# Patient Record
Sex: FEMALE | Race: WHITE | Employment: UNEMPLOYED | URBAN - METROPOLITAN AREA
[De-identification: names, ages, dates, MRNs, and addresses within clinical notes are randomized per-mention and may not be internally consistent; named-entity substitution may affect disease eponyms.]

---

## 2018-03-12 ENCOUNTER — HOSPITAL ENCOUNTER (OUTPATIENT)
Dept: MAMMOGRAPHY | Age: 43
Discharge: HOME | End: 2018-03-14
Payer: COMMERCIAL

## 2018-03-12 DIAGNOSIS — Z12.39 SCREENING BREAST EXAMINATION: ICD-10-CM

## 2018-03-12 PROCEDURE — 77067 SCR MAMMO BI INCL CAD: CPT

## 2018-06-17 ENCOUNTER — OFFICE VISIT (OUTPATIENT)
Dept: FAMILY MEDICINE CLINIC | Age: 43
End: 2018-06-17
Payer: COMMERCIAL

## 2018-06-17 VITALS
SYSTOLIC BLOOD PRESSURE: 108 MMHG | HEIGHT: 67 IN | DIASTOLIC BLOOD PRESSURE: 70 MMHG | HEART RATE: 84 BPM | WEIGHT: 140 LBS | TEMPERATURE: 98.4 F | BODY MASS INDEX: 21.97 KG/M2 | OXYGEN SATURATION: 97 %

## 2018-06-17 DIAGNOSIS — M79.671 RIGHT FOOT PAIN: Primary | ICD-10-CM

## 2018-06-17 PROCEDURE — 99213 OFFICE O/P EST LOW 20 MIN: CPT | Performed by: PHYSICIAN ASSISTANT

## 2018-06-19 ENCOUNTER — TELEPHONE (OUTPATIENT)
Dept: FAMILY MEDICINE CLINIC | Age: 43
End: 2018-06-19

## 2018-06-19 DIAGNOSIS — M79.671 RIGHT FOOT PAIN: ICD-10-CM

## 2018-07-13 ENCOUNTER — OFFICE VISIT (OUTPATIENT)
Dept: FAMILY MEDICINE CLINIC | Age: 43
End: 2018-07-13
Payer: COMMERCIAL

## 2018-07-13 VITALS
SYSTOLIC BLOOD PRESSURE: 119 MMHG | RESPIRATION RATE: 16 BRPM | DIASTOLIC BLOOD PRESSURE: 82 MMHG | TEMPERATURE: 98.3 F | HEIGHT: 67 IN | OXYGEN SATURATION: 97 % | WEIGHT: 140.4 LBS | HEART RATE: 67 BPM | BODY MASS INDEX: 22.03 KG/M2

## 2018-07-13 DIAGNOSIS — B36.0 TINEA VERSICOLOR: Primary | ICD-10-CM

## 2018-07-13 PROCEDURE — 99213 OFFICE O/P EST LOW 20 MIN: CPT | Performed by: FAMILY MEDICINE

## 2018-07-13 RX ORDER — ITRACONAZOLE 100 MG/1
200 CAPSULE ORAL DAILY
Qty: 10 CAPSULE | Refills: 0 | Status: SHIPPED | OUTPATIENT
Start: 2018-07-13 | End: 2018-07-25 | Stop reason: SDUPTHER

## 2018-07-16 ENCOUNTER — TELEPHONE (OUTPATIENT)
Dept: FAMILY MEDICINE CLINIC | Age: 43
End: 2018-07-16

## 2018-07-25 RX ORDER — ITRACONAZOLE 100 MG/1
200 CAPSULE ORAL DAILY
Qty: 14 CAPSULE | Refills: 0 | Status: SHIPPED | OUTPATIENT
Start: 2018-07-25 | End: 2018-08-01

## 2018-08-14 ENCOUNTER — HOSPITAL ENCOUNTER (OUTPATIENT)
Age: 43
Discharge: HOME | End: 2018-08-16
Payer: COMMERCIAL

## 2018-08-14 LAB
ALBUMIN SERPL-MCNC: 4.5 G/DL (ref 3.5–5.2)
ALP BLD-CCNC: 52 U/L (ref 35–104)
ALT SERPL-CCNC: 16 U/L (ref 0–32)
ANION GAP SERPL CALCULATED.3IONS-SCNC: 14 MMOL/L (ref 7–16)
AST SERPL-CCNC: 23 U/L (ref 0–31)
BASOPHILS ABSOLUTE: 0.06 E9/L (ref 0–0.2)
BASOPHILS RELATIVE PERCENT: 0.7 % (ref 0–2)
BILIRUB SERPL-MCNC: 0.9 MG/DL (ref 0–1.2)
BUN BLDV-MCNC: 7 MG/DL (ref 6–20)
CALCIUM SERPL-MCNC: 9.5 MG/DL (ref 8.6–10.2)
CHLORIDE BLD-SCNC: 101 MMOL/L (ref 98–107)
CO2: 27 MMOL/L (ref 22–29)
CREAT SERPL-MCNC: 0.9 MG/DL (ref 0.5–1)
EOSINOPHILS ABSOLUTE: 0.12 E9/L (ref 0.05–0.5)
EOSINOPHILS RELATIVE PERCENT: 1.4 % (ref 0–6)
FERRITIN: 92 NG/ML
GFR AFRICAN AMERICAN: >60
GFR NON-AFRICAN AMERICAN: >60 ML/MIN/1.73
GLUCOSE BLD-MCNC: 90 MG/DL (ref 74–109)
HCT VFR BLD CALC: 45.7 % (ref 34–48)
HEMOGLOBIN: 14.7 G/DL (ref 11.5–15.5)
IMMATURE GRANULOCYTES #: 0.03 E9/L
IMMATURE GRANULOCYTES %: 0.4 % (ref 0–5)
IRON SATURATION: 29 % (ref 15–50)
IRON: 87 MCG/DL (ref 37–145)
LYMPHOCYTES ABSOLUTE: 1.7 E9/L (ref 1.5–4)
LYMPHOCYTES RELATIVE PERCENT: 19.9 % (ref 20–42)
MCH RBC QN AUTO: 29.3 PG (ref 26–35)
MCHC RBC AUTO-ENTMCNC: 32.2 % (ref 32–34.5)
MCV RBC AUTO: 91.2 FL (ref 80–99.9)
MONOCYTES ABSOLUTE: 0.46 E9/L (ref 0.1–0.95)
MONOCYTES RELATIVE PERCENT: 5.4 % (ref 2–12)
NEUTROPHILS ABSOLUTE: 6.17 E9/L (ref 1.8–7.3)
NEUTROPHILS RELATIVE PERCENT: 72.2 % (ref 43–80)
PDW BLD-RTO: 13 FL (ref 11.5–15)
PLATELET # BLD: 276 E9/L (ref 130–450)
PMV BLD AUTO: 11.7 FL (ref 7–12)
POTASSIUM SERPL-SCNC: 4.6 MMOL/L (ref 3.5–5)
RBC # BLD: 5.01 E12/L (ref 3.5–5.5)
SODIUM BLD-SCNC: 142 MMOL/L (ref 132–146)
T3 FREE: 3.3 PG/ML (ref 2–4.4)
T4 FREE: 1.54 NG/DL (ref 0.93–1.7)
TOTAL IRON BINDING CAPACITY: 304 MCG/DL (ref 250–450)
TOTAL PROTEIN: 7.5 G/DL (ref 6.4–8.3)
TSH SERPL DL<=0.05 MIU/L-ACNC: 1.57 UIU/ML (ref 0.27–4.2)
WBC # BLD: 8.5 E9/L (ref 4.5–11.5)

## 2018-08-14 PROCEDURE — 82728 ASSAY OF FERRITIN: CPT

## 2018-08-14 PROCEDURE — 84439 ASSAY OF FREE THYROXINE: CPT

## 2018-08-14 PROCEDURE — 86038 ANTINUCLEAR ANTIBODIES: CPT

## 2018-08-14 PROCEDURE — 82306 VITAMIN D 25 HYDROXY: CPT

## 2018-08-14 PROCEDURE — 85025 COMPLETE CBC W/AUTO DIFF WBC: CPT

## 2018-08-14 PROCEDURE — 36415 COLL VENOUS BLD VENIPUNCTURE: CPT

## 2018-08-14 PROCEDURE — 84443 ASSAY THYROID STIM HORMONE: CPT

## 2018-08-14 PROCEDURE — 84481 FREE ASSAY (FT-3): CPT

## 2018-08-14 PROCEDURE — 83550 IRON BINDING TEST: CPT

## 2018-08-14 PROCEDURE — 80053 COMPREHEN METABOLIC PANEL: CPT

## 2018-08-14 PROCEDURE — 84630 ASSAY OF ZINC: CPT

## 2018-08-14 PROCEDURE — 83540 ASSAY OF IRON: CPT

## 2018-08-15 LAB
ANTI-NUCLEAR ANTIBODY (ANA): NEGATIVE
T3 TOTAL: 109.6 NG/DL (ref 80–200)
T4 TOTAL: 8.6 MCG/DL (ref 4.5–11.7)
VITAMIN D 25-HYDROXY: 45 NG/ML (ref 30–100)

## 2018-08-17 LAB — ZINC: 72 UG/DL (ref 60–120)

## 2018-08-20 ENCOUNTER — PATIENT MESSAGE (OUTPATIENT)
Dept: FAMILY MEDICINE CLINIC | Age: 43
End: 2018-08-20

## 2018-08-20 DIAGNOSIS — L80 VITILIGO: Primary | ICD-10-CM

## 2018-08-21 NOTE — TELEPHONE ENCOUNTER
From: Adeline Hearn  To: Genevieve Orozco MD  Sent: 8/20/2018 7:19 PM EDT  Subject: Visit Jay Lombardo,    I have some concerns regarding the recent visit where I was treated for Tinea Versicolor. It didn't go away with the second round of antibiotic so I followed up with a dermatologist, (Dr Francisca Pruitt) at [id number] [place] [address] Dermatology. He diagnosed it as Vitiligo and prescribed a steroid cream. He stressed the sooner we treat, the better chance for remission. I have a great amount of concern since it has spread considerably since my last visit with you. I would like to speak with you regarding following up with a rheumatologist as soon as possible. Any assistance would be greatly appreciated.     Thank you,  Shadi Ornelas  [phone]

## 2018-08-23 ENCOUNTER — PATIENT MESSAGE (OUTPATIENT)
Dept: FAMILY MEDICINE CLINIC | Age: 43
End: 2018-08-23

## 2018-08-24 ENCOUNTER — TELEPHONE (OUTPATIENT)
Dept: FAMILY MEDICINE CLINIC | Age: 43
End: 2018-08-24

## 2018-08-24 RX ORDER — PREDNISONE 20 MG/1
20 TABLET ORAL DAILY
Qty: 21 TABLET | Refills: 0 | Status: SHIPPED | OUTPATIENT
Start: 2018-08-24 | End: 2018-09-14

## 2018-09-04 ENCOUNTER — TELEPHONE (OUTPATIENT)
Dept: FAMILY MEDICINE CLINIC | Age: 43
End: 2018-09-04

## 2018-12-17 ENCOUNTER — OFFICE VISIT (OUTPATIENT)
Dept: FAMILY MEDICINE CLINIC | Age: 43
End: 2018-12-17
Payer: COMMERCIAL

## 2018-12-17 VITALS
BODY MASS INDEX: 22.85 KG/M2 | OXYGEN SATURATION: 98 % | HEART RATE: 68 BPM | TEMPERATURE: 98.5 F | SYSTOLIC BLOOD PRESSURE: 112 MMHG | WEIGHT: 145.6 LBS | HEIGHT: 67 IN | DIASTOLIC BLOOD PRESSURE: 79 MMHG | RESPIRATION RATE: 16 BRPM

## 2018-12-17 DIAGNOSIS — L71.0 PERIORAL DERMATITIS: Primary | ICD-10-CM

## 2018-12-17 DIAGNOSIS — Z00.00 ANNUAL PHYSICAL EXAM: ICD-10-CM

## 2018-12-17 PROCEDURE — 99213 OFFICE O/P EST LOW 20 MIN: CPT | Performed by: FAMILY MEDICINE

## 2018-12-17 RX ORDER — SULFAMETHOXAZOLE AND TRIMETHOPRIM 800; 160 MG/1; MG/1
TABLET ORAL
Refills: 0 | COMMUNITY
Start: 2018-12-14 | End: 2018-12-17

## 2018-12-17 RX ORDER — METRONIDAZOLE 7.5 MG/G
GEL TOPICAL
Qty: 45 G | Refills: 1 | Status: SHIPPED | OUTPATIENT
Start: 2018-12-17 | End: 2019-11-06 | Stop reason: ALTCHOICE

## 2019-01-15 LAB
ALBUMIN SERPL-MCNC: NORMAL G/DL
ALP BLD-CCNC: NORMAL U/L
ALT SERPL-CCNC: NORMAL U/L
ANION GAP SERPL CALCULATED.3IONS-SCNC: NORMAL MMOL/L
AST SERPL-CCNC: NORMAL U/L
BASOPHILS ABSOLUTE: NORMAL /ΜL
BASOPHILS RELATIVE PERCENT: NORMAL %
BILIRUB SERPL-MCNC: NORMAL MG/DL (ref 0.1–1.4)
BUN BLDV-MCNC: NORMAL MG/DL
CALCIUM SERPL-MCNC: NORMAL MG/DL
CHLORIDE BLD-SCNC: NORMAL MMOL/L
CHOLESTEROL, TOTAL: 181 MG/DL
CHOLESTEROL/HDL RATIO: 2.7
CO2: NORMAL MMOL/L
CREAT SERPL-MCNC: NORMAL MG/DL
EOSINOPHILS ABSOLUTE: NORMAL /ΜL
EOSINOPHILS RELATIVE PERCENT: NORMAL %
FOLATE: 19.4
GFR CALCULATED: NORMAL
GLUCOSE BLD-MCNC: 80 MG/DL
HCT VFR BLD CALC: 43.9 % (ref 36–46)
HDLC SERPL-MCNC: 67 MG/DL (ref 35–70)
HEMOGLOBIN: 14.4 G/DL (ref 12–16)
IRON: 0
LDL CHOLESTEROL CALCULATED: 102 MG/DL (ref 0–160)
LYMPHOCYTES ABSOLUTE: NORMAL /ΜL
LYMPHOCYTES RELATIVE PERCENT: NORMAL %
MCH RBC QN AUTO: NORMAL PG
MCHC RBC AUTO-ENTMCNC: NORMAL G/DL
MCV RBC AUTO: NORMAL FL
MONOCYTES ABSOLUTE: NORMAL /ΜL
MONOCYTES RELATIVE PERCENT: NORMAL %
NEUTROPHILS ABSOLUTE: NORMAL /ΜL
NEUTROPHILS RELATIVE PERCENT: NORMAL %
PDW BLD-RTO: NORMAL %
PLATELET # BLD: NORMAL K/ΜL
PMV BLD AUTO: NORMAL FL
POTASSIUM SERPL-SCNC: NORMAL MMOL/L
RBC # BLD: 4.89 10^6/ΜL
SODIUM BLD-SCNC: 142 MMOL/L
TOTAL IRON BINDING CAPACITY: NORMAL
TOTAL PROTEIN: NORMAL
TRIGL SERPL-MCNC: 60 MG/DL
TSH SERPL DL<=0.05 MIU/L-ACNC: 1.11 UIU/ML
VITAMIN B-12: 606
VLDLC SERPL CALC-MCNC: NORMAL MG/DL
WBC # BLD: 6.9 10^3/ML

## 2019-01-29 DIAGNOSIS — Z00.00 ANNUAL PHYSICAL EXAM: ICD-10-CM

## 2019-10-04 ENCOUNTER — HOSPITAL ENCOUNTER (OUTPATIENT)
Age: 44
Discharge: HOME | End: 2019-10-06
Payer: COMMERCIAL

## 2019-10-04 PROCEDURE — 88175 CYTOPATH C/V AUTO FLUID REDO: CPT

## 2019-10-04 PROCEDURE — 87624 HPV HI-RISK TYP POOLED RSLT: CPT

## 2019-10-10 LAB
HPV SAMPLE: NORMAL
HPV TYPE 16: NOT DETECTED
HPV TYPE 18: NOT DETECTED
HPV, HIGH RISK OTHER: NOT DETECTED
INTERPRETATION: NORMAL
SOURCE: NORMAL

## 2019-11-06 ENCOUNTER — OFFICE VISIT (OUTPATIENT)
Dept: FAMILY MEDICINE CLINIC | Age: 44
End: 2019-11-06
Payer: COMMERCIAL

## 2019-11-06 VITALS
HEART RATE: 68 BPM | RESPIRATION RATE: 18 BRPM | OXYGEN SATURATION: 99 % | WEIGHT: 142.8 LBS | HEIGHT: 67 IN | DIASTOLIC BLOOD PRESSURE: 74 MMHG | BODY MASS INDEX: 22.41 KG/M2 | TEMPERATURE: 97.6 F | SYSTOLIC BLOOD PRESSURE: 116 MMHG

## 2019-11-06 DIAGNOSIS — J01.90 ACUTE NON-RECURRENT SINUSITIS, UNSPECIFIED LOCATION: ICD-10-CM

## 2019-11-06 DIAGNOSIS — R51.9 SINUS HEADACHE: ICD-10-CM

## 2019-11-06 DIAGNOSIS — R07.0 PAIN IN THROAT: ICD-10-CM

## 2019-11-06 DIAGNOSIS — J02.9 SORE THROAT: Primary | ICD-10-CM

## 2019-11-06 DIAGNOSIS — R09.82 POSTNASAL DRIP: ICD-10-CM

## 2019-11-06 LAB — S PYO AG THROAT QL: NORMAL

## 2019-11-06 PROCEDURE — 99214 OFFICE O/P EST MOD 30 MIN: CPT | Performed by: PHYSICIAN ASSISTANT

## 2019-11-06 PROCEDURE — 96372 THER/PROPH/DIAG INJ SC/IM: CPT | Performed by: PHYSICIAN ASSISTANT

## 2019-11-06 PROCEDURE — 87880 STREP A ASSAY W/OPTIC: CPT | Performed by: PHYSICIAN ASSISTANT

## 2019-11-06 RX ORDER — CEFDINIR 300 MG/1
300 CAPSULE ORAL 2 TIMES DAILY
Qty: 14 CAPSULE | Refills: 0 | Status: SHIPPED | OUTPATIENT
Start: 2019-11-06 | End: 2019-11-13

## 2019-11-06 RX ORDER — METHYLPREDNISOLONE ACETATE 40 MG/ML
40 INJECTION, SUSPENSION INTRA-ARTICULAR; INTRALESIONAL; INTRAMUSCULAR; SOFT TISSUE ONCE
Status: COMPLETED | OUTPATIENT
Start: 2019-11-06 | End: 2019-11-06

## 2019-11-06 RX ORDER — METHYLPREDNISOLONE 4 MG/1
TABLET ORAL
Qty: 1 KIT | Refills: 0 | Status: SHIPPED | OUTPATIENT
Start: 2019-11-06 | End: 2019-11-19 | Stop reason: ALTCHOICE

## 2019-11-06 RX ADMIN — METHYLPREDNISOLONE ACETATE 40 MG: 40 INJECTION, SUSPENSION INTRA-ARTICULAR; INTRALESIONAL; INTRAMUSCULAR; SOFT TISSUE at 08:59

## 2019-11-18 ENCOUNTER — TELEPHONE (OUTPATIENT)
Dept: FAMILY MEDICINE CLINIC | Age: 44
End: 2019-11-18

## 2019-11-19 ENCOUNTER — HOSPITAL ENCOUNTER (OUTPATIENT)
Age: 44
Discharge: HOME | End: 2019-11-19
Payer: COMMERCIAL

## 2019-11-19 ENCOUNTER — HOSPITAL ENCOUNTER (OUTPATIENT)
Dept: CT IMAGING | Age: 44
Discharge: HOME | End: 2019-11-21
Payer: COMMERCIAL

## 2019-11-19 ENCOUNTER — OFFICE VISIT (OUTPATIENT)
Dept: FAMILY MEDICINE CLINIC | Age: 44
End: 2019-11-19
Payer: COMMERCIAL

## 2019-11-19 VITALS
DIASTOLIC BLOOD PRESSURE: 88 MMHG | TEMPERATURE: 98.5 F | HEART RATE: 80 BPM | WEIGHT: 134 LBS | OXYGEN SATURATION: 98 % | SYSTOLIC BLOOD PRESSURE: 138 MMHG | BODY MASS INDEX: 20.99 KG/M2

## 2019-11-19 DIAGNOSIS — K90.0 CELIAC DISEASE: ICD-10-CM

## 2019-11-19 DIAGNOSIS — R11.2 NAUSEA AND VOMITING, INTRACTABILITY OF VOMITING NOT SPECIFIED, UNSPECIFIED VOMITING TYPE: ICD-10-CM

## 2019-11-19 DIAGNOSIS — R10.33 PERIUMBILICAL ABDOMINAL PAIN: ICD-10-CM

## 2019-11-19 DIAGNOSIS — R11.2 NAUSEA AND VOMITING, INTRACTABILITY OF VOMITING NOT SPECIFIED, UNSPECIFIED VOMITING TYPE: Primary | ICD-10-CM

## 2019-11-19 LAB
ALBUMIN SERPL-MCNC: 4.7 G/DL (ref 3.5–5.2)
ALP BLD-CCNC: 47 U/L (ref 35–104)
ALT SERPL-CCNC: 14 U/L (ref 0–32)
AMYLASE: 60 U/L (ref 20–100)
ANION GAP SERPL CALCULATED.3IONS-SCNC: 14 MMOL/L (ref 7–16)
AST SERPL-CCNC: 20 U/L (ref 0–31)
BASOPHILS ABSOLUTE: 0.06 E9/L (ref 0–0.2)
BASOPHILS RELATIVE PERCENT: 0.6 % (ref 0–2)
BILIRUB SERPL-MCNC: 0.9 MG/DL (ref 0–1.2)
BUN BLDV-MCNC: 8 MG/DL (ref 6–20)
CALCIUM SERPL-MCNC: 9.6 MG/DL (ref 8.6–10.2)
CHLORIDE BLD-SCNC: 102 MMOL/L (ref 98–107)
CO2: 25 MMOL/L (ref 22–29)
CREAT SERPL-MCNC: 1 MG/DL (ref 0.5–1)
EOSINOPHILS ABSOLUTE: 0.14 E9/L (ref 0.05–0.5)
EOSINOPHILS RELATIVE PERCENT: 1.3 % (ref 0–6)
GFR AFRICAN AMERICAN: >60
GFR NON-AFRICAN AMERICAN: >60 ML/MIN/1.73
GLUCOSE BLD-MCNC: 89 MG/DL (ref 74–99)
HCT VFR BLD CALC: 45.6 % (ref 34–48)
HEMOGLOBIN: 14.9 G/DL (ref 11.5–15.5)
IMMATURE GRANULOCYTES #: 0.03 E9/L
IMMATURE GRANULOCYTES %: 0.3 % (ref 0–5)
LIPASE: 33 U/L (ref 13–60)
LYMPHOCYTES ABSOLUTE: 2.36 E9/L (ref 1.5–4)
LYMPHOCYTES RELATIVE PERCENT: 22.1 % (ref 20–42)
MCH RBC QN AUTO: 28.9 PG (ref 26–35)
MCHC RBC AUTO-ENTMCNC: 32.7 % (ref 32–34.5)
MCV RBC AUTO: 88.5 FL (ref 80–99.9)
MONOCYTES ABSOLUTE: 0.57 E9/L (ref 0.1–0.95)
MONOCYTES RELATIVE PERCENT: 5.3 % (ref 2–12)
NEUTROPHILS ABSOLUTE: 7.5 E9/L (ref 1.8–7.3)
NEUTROPHILS RELATIVE PERCENT: 70.4 % (ref 43–80)
PDW BLD-RTO: 12.1 FL (ref 11.5–15)
PLATELET # BLD: 253 E9/L (ref 130–450)
PMV BLD AUTO: 10.6 FL (ref 7–12)
POTASSIUM SERPL-SCNC: 4.1 MMOL/L (ref 3.5–5)
RBC # BLD: 5.15 E12/L (ref 3.5–5.5)
SODIUM BLD-SCNC: 141 MMOL/L (ref 132–146)
TOTAL PROTEIN: 7.1 G/DL (ref 6.4–8.3)
WBC # BLD: 10.7 E9/L (ref 4.5–11.5)

## 2019-11-19 PROCEDURE — 82150 ASSAY OF AMYLASE: CPT

## 2019-11-19 PROCEDURE — 6360000004 HC RX CONTRAST MEDICATION: Performed by: RADIOLOGY

## 2019-11-19 PROCEDURE — 83690 ASSAY OF LIPASE: CPT

## 2019-11-19 PROCEDURE — 86677 HELICOBACTER PYLORI ANTIBODY: CPT

## 2019-11-19 PROCEDURE — 80053 COMPREHEN METABOLIC PANEL: CPT

## 2019-11-19 PROCEDURE — 99214 OFFICE O/P EST MOD 30 MIN: CPT | Performed by: FAMILY MEDICINE

## 2019-11-19 PROCEDURE — 74177 CT ABD & PELVIS W/CONTRAST: CPT

## 2019-11-19 PROCEDURE — 2580000003 HC RX 258: Performed by: RADIOLOGY

## 2019-11-19 PROCEDURE — 85025 COMPLETE CBC W/AUTO DIFF WBC: CPT

## 2019-11-19 PROCEDURE — 36415 COLL VENOUS BLD VENIPUNCTURE: CPT

## 2019-11-19 RX ORDER — SODIUM CHLORIDE 0.9 % (FLUSH) 0.9 %
10 SYRINGE (ML) INJECTION 2 TIMES DAILY
Status: DISCONTINUED | OUTPATIENT
Start: 2019-11-19 | End: 2019-11-22 | Stop reason: HOSPADM

## 2019-11-19 RX ORDER — SUCRALFATE 1 G/1
1 TABLET ORAL 4 TIMES DAILY
Qty: 120 TABLET | Refills: 3 | Status: SHIPPED
Start: 2019-11-19 | End: 2021-03-16 | Stop reason: CLARIF

## 2019-11-19 RX ORDER — OMEPRAZOLE 40 MG/1
40 CAPSULE, DELAYED RELEASE ORAL DAILY
Qty: 30 CAPSULE | Refills: 3 | Status: SHIPPED
Start: 2019-11-19 | End: 2021-03-16 | Stop reason: CLARIF

## 2019-11-19 RX ADMIN — IOPAMIDOL 100 ML: 755 INJECTION, SOLUTION INTRAVENOUS at 16:26

## 2019-11-19 RX ADMIN — Medication 10 ML: at 16:26

## 2019-11-19 RX ADMIN — IOHEXOL 50 ML: 240 INJECTION, SOLUTION INTRATHECAL; INTRAVASCULAR; INTRAVENOUS; ORAL at 16:26

## 2019-11-21 LAB — HELICOBACTER PYLORI IGG: NORMAL

## 2019-12-02 ENCOUNTER — HOSPITAL ENCOUNTER (OUTPATIENT)
Dept: GENERAL RADIOLOGY | Age: 44
Discharge: HOME | End: 2019-12-04
Payer: COMMERCIAL

## 2019-12-02 DIAGNOSIS — R92.2 DENSE BREAST: ICD-10-CM

## 2019-12-02 DIAGNOSIS — Z12.31 VISIT FOR SCREENING MAMMOGRAM: ICD-10-CM

## 2019-12-02 PROCEDURE — 76641 ULTRASOUND BREAST COMPLETE: CPT

## 2019-12-02 PROCEDURE — 77063 BREAST TOMOSYNTHESIS BI: CPT

## 2019-12-09 ENCOUNTER — HOSPITAL ENCOUNTER (OUTPATIENT)
Dept: GENERAL RADIOLOGY | Age: 44
Discharge: HOME | End: 2019-12-11
Payer: COMMERCIAL

## 2019-12-09 DIAGNOSIS — R10.33 PERIUMBILICAL DISCOMFORT: ICD-10-CM

## 2019-12-09 PROCEDURE — 74245 FL UGI W SMALL BOWEL: CPT

## 2019-12-12 ENCOUNTER — HOSPITAL ENCOUNTER (OUTPATIENT)
Age: 44
Setting detail: OUTPATIENT SURGERY
Discharge: HOME | End: 2019-12-12
Attending: INTERNAL MEDICINE | Admitting: INTERNAL MEDICINE
Payer: COMMERCIAL

## 2019-12-12 PROCEDURE — 3609015500 HC GASTRIC/DUODENAL MOTILITY &/OR MANOMETRY STUDY: Performed by: INTERNAL MEDICINE

## 2019-12-12 PROCEDURE — 6370000000 HC RX 637 (ALT 250 FOR IP): Performed by: INTERNAL MEDICINE

## 2019-12-12 RX ORDER — LIDOCAINE HYDROCHLORIDE 20 MG/ML
JELLY TOPICAL PRN
Status: DISCONTINUED | OUTPATIENT
Start: 2019-12-12 | End: 2019-12-12 | Stop reason: ALTCHOICE

## 2019-12-30 ENCOUNTER — TELEPHONE (OUTPATIENT)
Dept: FAMILY MEDICINE CLINIC | Age: 44
End: 2019-12-30

## 2019-12-30 DIAGNOSIS — R79.0 RAISED SERUM IRON: Primary | ICD-10-CM

## 2020-01-02 ENCOUNTER — TELEPHONE (OUTPATIENT)
Dept: FAMILY MEDICINE CLINIC | Age: 45
End: 2020-01-02

## 2020-01-02 NOTE — TELEPHONE ENCOUNTER
Her insurance covers [facility] drs and a referral was sent to dr Miri Wilks at [facility].  She asked if there is someone around here with a [facility] physician instead

## 2020-01-09 ENCOUNTER — HOSPITAL ENCOUNTER (OUTPATIENT)
Dept: CT IMAGING | Age: 45
Discharge: HOME | End: 2020-01-11
Payer: COMMERCIAL

## 2020-01-09 PROCEDURE — 6360000004 HC RX CONTRAST MEDICATION: Performed by: RADIOLOGY

## 2020-01-09 PROCEDURE — 71270 CT THORAX DX C-/C+: CPT

## 2020-01-09 RX ADMIN — IOPAMIDOL 80 ML: 755 INJECTION, SOLUTION INTRAVENOUS at 09:43

## 2020-02-21 LAB
C DIFFICILE TOXINS, PCR: NOT DETECTED
FAT QUALITATIVE NEUTRAL STOOL: NORMAL
FAT QUALITATIVE SPLIT STOOL: NORMAL

## 2020-02-24 LAB
CALPROTECTIN, FECAL: 89 UG/G
PANCREATIC ELASTASE, FECAL: >500 UG/G

## 2020-05-27 LAB
ERYTHROCYTE [DISTWIDTH] IN BLOOD BY AUTOMATED COUNT: 12.9 % (ref 11.5–14)
HCT VFR BLD CALC: 46.1 % (ref 36–46)
HEMOGLOBIN: 14.9 G/DL (ref 12–16)
IRON SATURATION: 24 % (ref 25–45)
IRON: 71 UG/DL (ref 35–150)
MCHC RBC AUTO-ENTMCNC: 32.3 G/DL (ref 32–36)
MCV RBC AUTO: 89 FL (ref 80–100)
PLATELET # BLD: 271 X10E9/L (ref 150–450)
RBC # BLD: 5.19 X10E12/L (ref 4–5.2)
TOTAL IRON BINDING CAPACITY: 302 UG/DL (ref 240–445)
WBC: 7.2 X10E9/L (ref 4.4–11.3)

## 2020-05-28 LAB — TISSUE TRANSGLUTAMINASE IGA: 5 (ref 0–14)

## 2020-05-30 LAB — VITAMIN D 1,25-DIHYDROXY: 44.1 PG/ML (ref 19.9–79.3)

## 2020-12-02 ENCOUNTER — HOSPITAL ENCOUNTER (OUTPATIENT)
Age: 45
Discharge: HOME | End: 2020-12-02
Payer: COMMERCIAL

## 2020-12-02 LAB
HBA1C MFR BLD: 5.1 % (ref 4–5.6)
IRON SATURATION: 54 % (ref 15–50)
IRON: 158 MCG/DL (ref 37–145)
TOTAL IRON BINDING CAPACITY: 291 MCG/DL (ref 250–450)

## 2020-12-02 PROCEDURE — 36415 COLL VENOUS BLD VENIPUNCTURE: CPT

## 2020-12-02 PROCEDURE — 83036 HEMOGLOBIN GLYCOSYLATED A1C: CPT

## 2020-12-02 PROCEDURE — 83550 IRON BINDING TEST: CPT

## 2020-12-02 PROCEDURE — 83540 ASSAY OF IRON: CPT

## 2021-03-15 NOTE — PROGRESS NOTES
[facility] Internal Medicine      SUBJECTIVE:  Vicky Hernandez (:  1975) is a 41 y.o. female here for evaluation of the following chief complaint(s):  New Patient  GI at [facility]. Was prescribed low dose naltrexone. This was for celiac. Only took one pill but had severe side effects. Slept for days. In the morning - has to have multiple hours to get ready. Urgent need to have a bowel movement. Immodium will stop her up completely. Healthy diet. Fresh food. Multiple ear infections in the past.      History of iron deficiency anemia - thought secondary to celiac and/or menstural causes. Ablation was performed. Lymphocytic colitis in the past.     Vitiligo - in inguinal area and was placed on cream.  Stopped during covid. Worried about getting COVID vaccine due to concern about immunosuppression. Dr. Santana Kohler. Wanted her checked for DM because vision had changed. + palpitations - daily. Regular. Notices slower pulse in the morning. Sometimes feels like pins in her feet when she walks. + cold all the time. + wrist pain - bilaterally. Drops things because of it. Father with same symptoms. Review of Systems   Constitutional: Negative for appetite change, chills, fever and unexpected weight change. HENT: Positive for congestion (on and off), dental problem (multiple crowns and fillings. ) and trouble swallowing (occasional). Negative for facial swelling, hearing loss, nosebleeds, sore throat and tinnitus. Eyes: Positive for visual disturbance (+ blurry vision). Respiratory: Negative for cough, shortness of breath and wheezing. Cardiovascular: Positive for palpitations. Negative for chest pain and leg swelling. Gastrointestinal: Positive for abdominal pain (to the L of umbilicus) and diarrhea. Negative for blood in stool, nausea and vomiting. Endocrine: Positive for cold intolerance.  Negative for polydipsia, polyphagia and polyuria. Genitourinary: Negative for difficulty urinating, dysuria, hematuria, menstrual problem and vaginal bleeding (Had endometrial ablation). Musculoskeletal: Positive for arthralgias. Negative for joint swelling, neck pain and neck stiffness. Allergic/Immunologic: Positive for food allergies (Celiac). Neurological: Positive for headaches (for last week. ). Negative for dizziness, tremors, seizures and light-headedness. Hematological: Negative for adenopathy. Bruises/bleeds easily. Psychiatric/Behavioral: Positive for sleep disturbance (erratic). Negative for decreased concentration and suicidal ideas. The patient is nervous/anxious. Current Outpatient Medications on File Prior to Visit   Medication Sig Dispense Refill    BIOTIN PO Take 600 mcg by mouth daily       No current facility-administered medications on file prior to visit. OBJECTIVE:    VS:   Vitals:    03/16/21 0910   BP: 129/87   Pulse: 67   Resp: 16   Temp: 97.1 °F (36.2 °C)   TempSrc: Temporal   Weight: 141 lb (64 kg)   Height: 5' 7\" (1.702 m)     Physical Exam   HEENT:  PERRL, EOMI, Mouth without erythema or exudate, TMs clear B. Lungs:  CTA B, No wheezes. No flank tenderness, no vertebral column tenderness. Neck:   No carotid bruits appreciated B. No Thyromegaly or masses noted. CVS:  +s1/s2 without m/g/r appreciated. Abd:  + BS, NTND, No renal or aortic bruits, + tenderness approximately 1 fingerbreadth to the L of the umbilicus. No definitive hernia palpated. No organomegaly appreciated. +RUQ tenderness to deep palpation of liver. Extr:  2+ DP/PT pulses B, no pitting edema  Neurological exam reveals alert, oriented, normal speech, no focal findings or movement disorder noted, neck supple without rigidity, cranial nerves II through XII intact, DTR's normal and symmetric, normal muscle tone, no tremors, strength 5/5,  normal gait and station. No pronator drift. No clonus. ASSESSMENT/PLAN:  1. Lymphocytic colitis - ongoing and most pressing issue for patient. GI symptoms continue to be a daily issue.   -    Check CBC Auto Differential; Future  -    Check Comprehensive Metabolic Panel; Future  -     Check Lipid Panel; Future  -   Need GI records from [facility] to further understand the work-up that has thus far been completed and what may need evaluated moving forward. 2. Multiple thyroid nodules - +cold intolerance as well  -    Check  TSH without Reflex; Future  3. Other iron deficiency anemia - history of such   -     Check IRON AND TIBC; Future  -    Check  FERRITIN; Future  4. Anxiety - multi-factorial but related to uncertainty and ongoing symptoms that are unexplained. Discussed referral to [facility] for this, patient declines at this time. RTC:  Return in about 4 weeks (around 4/13/2021).       Vasyl Elizabeth MD   3/18/2021 10:13 AM

## 2021-03-16 ENCOUNTER — TELEPHONE (OUTPATIENT)
Dept: INTERNAL MEDICINE | Age: 46
End: 2021-03-16

## 2021-03-16 ENCOUNTER — OFFICE VISIT (OUTPATIENT)
Dept: INTERNAL MEDICINE | Age: 46
End: 2021-03-16
Payer: COMMERCIAL

## 2021-03-16 ENCOUNTER — HOSPITAL ENCOUNTER (OUTPATIENT)
Age: 46
Discharge: HOME | End: 2021-03-16
Payer: COMMERCIAL

## 2021-03-16 VITALS
DIASTOLIC BLOOD PRESSURE: 87 MMHG | BODY MASS INDEX: 22.13 KG/M2 | WEIGHT: 141 LBS | TEMPERATURE: 97.1 F | HEIGHT: 67 IN | SYSTOLIC BLOOD PRESSURE: 129 MMHG | RESPIRATION RATE: 16 BRPM | HEART RATE: 67 BPM

## 2021-03-16 DIAGNOSIS — D50.8 OTHER IRON DEFICIENCY ANEMIA: ICD-10-CM

## 2021-03-16 DIAGNOSIS — F41.9 ANXIETY: ICD-10-CM

## 2021-03-16 DIAGNOSIS — K52.832 LYMPHOCYTIC COLITIS: Primary | ICD-10-CM

## 2021-03-16 DIAGNOSIS — K52.832 LYMPHOCYTIC COLITIS: ICD-10-CM

## 2021-03-16 DIAGNOSIS — E04.2 MULTIPLE THYROID NODULES: ICD-10-CM

## 2021-03-16 LAB
ALBUMIN SERPL-MCNC: 4.5 G/DL (ref 3.5–5.2)
ALP BLD-CCNC: 49 U/L (ref 35–104)
ALT SERPL-CCNC: 13 U/L (ref 0–32)
ANION GAP SERPL CALCULATED.3IONS-SCNC: 6 MMOL/L (ref 7–16)
AST SERPL-CCNC: 18 U/L (ref 0–31)
BASOPHILS ABSOLUTE: 0.05 E9/L (ref 0–0.2)
BASOPHILS RELATIVE PERCENT: 0.5 % (ref 0–2)
BILIRUB SERPL-MCNC: 1.1 MG/DL (ref 0–1.2)
BUN BLDV-MCNC: 8 MG/DL (ref 6–20)
CALCIUM SERPL-MCNC: 9.4 MG/DL (ref 8.6–10.2)
CHLORIDE BLD-SCNC: 104 MMOL/L (ref 98–107)
CHOLESTEROL, TOTAL: 182 MG/DL (ref 0–199)
CO2: 27 MMOL/L (ref 22–29)
CREAT SERPL-MCNC: 1 MG/DL (ref 0.5–1)
EOSINOPHILS ABSOLUTE: 0.07 E9/L (ref 0.05–0.5)
EOSINOPHILS RELATIVE PERCENT: 0.8 % (ref 0–6)
FERRITIN: 48 NG/ML
GFR AFRICAN AMERICAN: >60
GFR NON-AFRICAN AMERICAN: >60 ML/MIN/1.73
GLUCOSE BLD-MCNC: 91 MG/DL (ref 74–99)
HCT VFR BLD CALC: 42.4 % (ref 34–48)
HDLC SERPL-MCNC: 66 MG/DL
HEMOGLOBIN: 13.6 G/DL (ref 11.5–15.5)
IMMATURE GRANULOCYTES #: 0.02 E9/L
IMMATURE GRANULOCYTES %: 0.2 % (ref 0–5)
IRON SATURATION: 48 % (ref 15–50)
IRON: 139 MCG/DL (ref 37–145)
LDL CHOLESTEROL CALCULATED: 103 MG/DL (ref 0–99)
LYMPHOCYTES ABSOLUTE: 2.05 E9/L (ref 1.5–4)
LYMPHOCYTES RELATIVE PERCENT: 22.5 % (ref 20–42)
MCH RBC QN AUTO: 28.8 PG (ref 26–35)
MCHC RBC AUTO-ENTMCNC: 32.1 % (ref 32–34.5)
MCV RBC AUTO: 89.8 FL (ref 80–99.9)
MONOCYTES ABSOLUTE: 0.53 E9/L (ref 0.1–0.95)
MONOCYTES RELATIVE PERCENT: 5.8 % (ref 2–12)
NEUTROPHILS ABSOLUTE: 6.4 E9/L (ref 1.8–7.3)
NEUTROPHILS RELATIVE PERCENT: 70.2 % (ref 43–80)
PDW BLD-RTO: 12.6 FL (ref 11.5–15)
PLATELET # BLD: 262 E9/L (ref 130–450)
PMV BLD AUTO: 11.2 FL (ref 7–12)
POTASSIUM SERPL-SCNC: 3.8 MMOL/L (ref 3.5–5)
RBC # BLD: 4.72 E12/L (ref 3.5–5.5)
SODIUM BLD-SCNC: 137 MMOL/L (ref 132–146)
TOTAL IRON BINDING CAPACITY: 287 MCG/DL (ref 250–450)
TOTAL PROTEIN: 7.3 G/DL (ref 6.4–8.3)
TRIGL SERPL-MCNC: 63 MG/DL (ref 0–149)
TSH SERPL DL<=0.05 MIU/L-ACNC: 0.99 UIU/ML (ref 0.27–4.2)
VLDLC SERPL CALC-MCNC: 13 MG/DL
WBC # BLD: 9.1 E9/L (ref 4.5–11.5)

## 2021-03-16 PROCEDURE — 80053 COMPREHEN METABOLIC PANEL: CPT

## 2021-03-16 PROCEDURE — 99204 OFFICE O/P NEW MOD 45 MIN: CPT | Performed by: INTERNAL MEDICINE

## 2021-03-16 PROCEDURE — 85025 COMPLETE CBC W/AUTO DIFF WBC: CPT

## 2021-03-16 PROCEDURE — 83550 IRON BINDING TEST: CPT

## 2021-03-16 PROCEDURE — 82728 ASSAY OF FERRITIN: CPT

## 2021-03-16 PROCEDURE — 36415 COLL VENOUS BLD VENIPUNCTURE: CPT

## 2021-03-16 PROCEDURE — 80061 LIPID PANEL: CPT

## 2021-03-16 PROCEDURE — 83540 ASSAY OF IRON: CPT

## 2021-03-16 PROCEDURE — 84443 ASSAY THYROID STIM HORMONE: CPT

## 2021-03-16 SDOH — ECONOMIC STABILITY: TRANSPORTATION INSECURITY
IN THE PAST 12 MONTHS, HAS LACK OF TRANSPORTATION KEPT YOU FROM MEETINGS, WORK, OR FROM GETTING THINGS NEEDED FOR DAILY LIVING?: NO

## 2021-03-16 SDOH — ECONOMIC STABILITY: FOOD INSECURITY: WITHIN THE PAST 12 MONTHS, THE FOOD YOU BOUGHT JUST DIDN'T LAST AND YOU DIDN'T HAVE MONEY TO GET MORE.: NEVER TRUE

## 2021-03-16 ASSESSMENT — PATIENT HEALTH QUESTIONNAIRE - PHQ9
SUM OF ALL RESPONSES TO PHQ QUESTIONS 1-9: 0

## 2021-03-16 ASSESSMENT — ENCOUNTER SYMPTOMS
SORE THROAT: 0
NAUSEA: 0
ABDOMINAL PAIN: 1
COUGH: 0
BLOOD IN STOOL: 0
FACIAL SWELLING: 0
SHORTNESS OF BREATH: 0
WHEEZING: 0
DIARRHEA: 1
VOMITING: 0
TROUBLE SWALLOWING: 1

## 2021-03-16 NOTE — TELEPHONE ENCOUNTER
SW contacted pt related to referral. Pt reports declining counseling at this time.  SW did provide contact information for pt if she ever would like another referral

## 2021-03-18 PROBLEM — K52.9 CHRONIC DIARRHEA: Status: ACTIVE | Noted: 2021-03-18

## 2021-03-18 PROBLEM — L80 VITILIGO: Status: ACTIVE | Noted: 2021-03-18

## 2021-04-14 ENCOUNTER — OFFICE VISIT (OUTPATIENT)
Dept: INTERNAL MEDICINE | Age: 46
End: 2021-04-14
Payer: COMMERCIAL

## 2021-04-14 VITALS
WEIGHT: 144 LBS | DIASTOLIC BLOOD PRESSURE: 83 MMHG | TEMPERATURE: 97.8 F | BODY MASS INDEX: 22.6 KG/M2 | HEIGHT: 67 IN | HEART RATE: 65 BPM | SYSTOLIC BLOOD PRESSURE: 123 MMHG | RESPIRATION RATE: 16 BRPM | OXYGEN SATURATION: 100 %

## 2021-04-14 DIAGNOSIS — K90.0 CELIAC DISEASE: ICD-10-CM

## 2021-04-14 DIAGNOSIS — K52.832 LYMPHOCYTIC COLITIS: ICD-10-CM

## 2021-04-14 DIAGNOSIS — E04.1 THYROID NODULE: Primary | ICD-10-CM

## 2021-04-14 PROCEDURE — 99213 OFFICE O/P EST LOW 20 MIN: CPT | Performed by: INTERNAL MEDICINE

## 2021-04-14 NOTE — PROGRESS NOTES
[facility] Internal Medicine      SUBJECTIVE:  Bernie Saldana (:  1975) is a 41 y.o. female here for evaluation of the following chief complaint(s):  1 Month Follow-Up and Results  Colitis - ongoing symptoms with current constipation over the past 4 days. Continues to have bouts of diarrhea as well. Following a gluten free diet. Last colonoscopy, patient relates a prolonged recovery from Fentanyl use 2019). She is concerned about anesthesia for a repeat colonoscopy. Esophageal motility study reviewed from 19. No motility abnormality identified at that time. I reviewed pathology report of 2019. Villous blunting with increased lymphocytes consistent with celiac sprue. Patient     Complains of cracking of the skin at distal end of fingers on the R side. Thyroid nodules - No recent thyroid US has been performed. Review of Systems - as above. Current Outpatient Medications on File Prior to Visit   Medication Sig Dispense Refill    BIOTIN PO Take 600 mcg by mouth daily       No current facility-administered medications on file prior to visit. OBJECTIVE:    VS:   Vitals:    21 0923   BP: 123/83   Site: Left Upper Arm   Position: Sitting   Cuff Size: Medium Adult   Pulse: 65   Resp: 16   Temp: 97.8 °F (36.6 °C)   TempSrc: Oral   SpO2: 100%   Weight: 144 lb (65.3 kg)   Height: 5' 7\" (1.702 m)     Physical Exam   Lungs:  CTA B  Neck:   No carotid bruits appreciated B.   CVS:  +s1/s2 without m/g/r appreciated. Abd:  + BS, + bloated abdomen noted with + pain to palpation to L of umbilicus without guarding or hernia, No renal or aortic bruits   Extr:  2+ DP/PT pulses B, no pitting edema, + skin cracking at distal end of R hand (see image). + thin nails with mild pitting noted. ASSESSMENT/PLAN:  1. Thyroid nodule - needs repeat US to further assess.   -     Check repeatUS THYROID; Future    2.  Lymphocytic colitis with Celiac disease - ongoing symptoms  -     Issac Mackenzie MD, General Surgery, [facility] for repeat colonoscopy to assess for continued active disease/additiional diagnoses. If still active disease, may benefit from budesonide therapy. Will await biopsy prior to initiation of this therapy. If nothing revealing on repeat endoscopy, consider referral to allergy for further testing as contributing cause of symptoms. RTC:  Return in about 2 months (around 6/14/2021).       Barrera Krishna MD   4/16/2021 11:46 AM

## 2021-04-15 ENCOUNTER — TELEPHONE (OUTPATIENT)
Dept: SURGERY | Age: 46
End: 2021-04-15

## 2021-04-15 NOTE — TELEPHONE ENCOUNTER
MA made first attempt to contact patient to schedule appointment based on referral by Dr Camille Marks for celiac diseas consult. Patient did not answer so MA left message requesting return call to schedule with first available.     Electronically signed by Suhail Walden on 4/15/21 at 10:07 AM EDT

## 2021-04-16 ENCOUNTER — TELEPHONE (OUTPATIENT)
Dept: SURGERY | Age: 46
End: 2021-04-16

## 2021-04-16 NOTE — TELEPHONE ENCOUNTER
MA received phone call from patient in regards to scheduling appointment based on referral by Dr Mikki Gonzalez. Patient scheduled for appointment 5/4/2021 @ 9:45am with Dr Loreta Jack via Virtual Visit. Patient informed of process and MA confirmed phone number to call. Appointment letter mailed to patient.      Electronically signed by Alda Cam on 4/16/21 at 9:19 AM EDT

## 2021-05-04 ENCOUNTER — VIRTUAL VISIT (OUTPATIENT)
Dept: SURGERY | Age: 46
End: 2021-05-04
Payer: COMMERCIAL

## 2021-05-04 ENCOUNTER — PREP FOR PROCEDURE (OUTPATIENT)
Dept: SURGERY | Age: 46
End: 2021-05-04

## 2021-05-04 DIAGNOSIS — R10.9 ABDOMINAL PAIN, UNSPECIFIED ABDOMINAL LOCATION: ICD-10-CM

## 2021-05-04 DIAGNOSIS — K90.0 CELIAC DISEASE: Primary | ICD-10-CM

## 2021-05-04 DIAGNOSIS — K52.9 CHRONIC DIARRHEA: ICD-10-CM

## 2021-05-04 PROCEDURE — 99213 OFFICE O/P EST LOW 20 MIN: CPT | Performed by: SURGERY

## 2021-05-04 RX ORDER — SODIUM CHLORIDE 9 MG/ML
25 INJECTION, SOLUTION INTRAVENOUS PRN
Status: CANCELLED | OUTPATIENT
Start: 2021-05-04

## 2021-05-04 RX ORDER — SODIUM CHLORIDE 9 MG/ML
INJECTION, SOLUTION INTRAVENOUS CONTINUOUS
Status: CANCELLED | OUTPATIENT
Start: 2021-05-04

## 2021-05-04 RX ORDER — SODIUM CHLORIDE 0.9 % (FLUSH) 0.9 %
10 SYRINGE (ML) INJECTION EVERY 12 HOURS SCHEDULED
Status: CANCELLED | OUTPATIENT
Start: 2021-05-04

## 2021-05-04 RX ORDER — SODIUM, POTASSIUM,MAG SULFATES 17.5-3.13G
1 SOLUTION, RECONSTITUTED, ORAL ORAL ONCE
Qty: 1 BOTTLE | Refills: 0 | Status: SHIPPED | OUTPATIENT
Start: 2021-05-04 | End: 2021-05-04

## 2021-05-04 RX ORDER — SODIUM CHLORIDE 0.9 % (FLUSH) 0.9 %
10 SYRINGE (ML) INJECTION PRN
Status: CANCELLED | OUTPATIENT
Start: 2021-05-04

## 2021-05-04 NOTE — PROGRESS NOTES
 Intimate partner violence     Fear of current or ex partner: Not on file     Emotionally abused: Not on file     Physically abused: Not on file     Forced sexual activity: Not on file   Other Topics Concern    Not on file   Social History Narrative    Not on file        Family History   Problem Relation Age of Onset    Cancer Mother 48        breast    Heart Disease Father         CABG x 3, s/p mitral valve and MAZE procedure    High Blood Pressure Father     High Cholesterol Father     Diabetes Maternal Aunt     Other Sister     Other Brother         psoriasis    Heart Disease Maternal Grandmother     Cancer Maternal Grandfather         stomach    Other Paternal Grandmother         Parkinson's and Alzheimers    Heart Disease Paternal Grandfather         Current Outpatient Medications on File Prior to Visit   Medication Sig Dispense Refill    BIOTIN PO Take 600 mcg by mouth daily       No current facility-administered medications on file prior to visit.          Allergies   Allergen Reactions    Codeine Nausea And Vomiting        Review of Systems  General - no chills, fever, weight gain or weight loss  Psychological - no anxiety or depression  Ophthalmic - no blurry vision or double vision  ENT - no epistaxis or sore throat  Hematological and Lymphatic - no bleeding problems or blood clots  Endocrine - no temperature intolerance or unexpected weight changes  Respiratory - no cough, shortness of breath, or wheezing  Cardiovascular - no chest pain or dyspnea on exertion  Gastrointestinal -see HPI Genito-Urinary - no dysuria, trouble voiding, or hematuria  Musculoskeletal - no gait disturbance, joint pain or muscular weakness  Neurological - no headaches, numbness/tingling or weakness  Dermatological - no pruritus or rash    Physical Exam     Constitutional: [x] Appears well-developed and well-nourished [x] No apparent distress      [] Abnormal-   Mental status  [x] Alert and awake  [x] Oriented to person/place/time []Able to follow commands      Eyes:  EOM    [x]  Normal  [] Abnormal-  Sclera  [x]  Normal  [] Abnormal -         Discharge [x]  None visible  [] Abnormal -    HENT:   [x] Normocephalic, atraumatic. [x] Abnormal   [] Mouth/Throat: Mucous membranes are moist.     External Ears [] Normal  [] Abnormal-     Neck: [x] No visualized mass     Pulmonary/Chest: [x] Respiratory effort normal.  [x] No visualized signs of difficulty breathing or respiratory distress        [] Abnormal-      Musculoskeletal:   [x] Normal gait with no signs of ataxia         [x] Normal range of motion of neck        [] Abnormal-       Neurological:        [x] No Facial Asymmetry (Cranial nerve 7 motor function) (limited exam to video visit)          [x] No gaze palsy        [] Abnormal-         Skin:        [x] No significant exanthematous lesions or discoloration noted on facial skin         [] Abnormal-            Psychiatric:       [] Normal Affect [x] No Hallucinations        [] Abnormal-     Other pertinent observable physical exam findings-     EMR reports were reviewed. Assessment  Abdominal pain with irregular bowel habitsunclear etiology  Celiac diseaseapparently well controlled  Lymphocytic colitismay be related to her symptoms at this point since she is not under treatment    Plan    Esophagogastroduodenoscopy and diagnostic colonoscopy    Tiara Murphy is a 41 y.o. female being evaluated by a Virtual Visit (video visit) encounter to address concerns as mentioned above. A caregiver was present when appropriate. Due to this being a TeleHealth encounter (During XHYJF-72 public health emergency), evaluation of the following organ systems was limited: Vitals/Constitutional/EENT/Resp/CV/GI//MS/Neuro/Skin/Heme-Lymph-Imm.   Pursuant to the emergency declaration under the [facility], [address] [place] [address] authority and the [email or web address] [facility] Act, this Virtual Visit was conducted with patient's (and/or legal guardian's) consent, to reduce the patient's risk of exposure to COVID-19 and provide necessary medical care. The patient (and/or legal guardian) has also been advised to contact this office for worsening conditions or problems, and seek emergency medical treatment and/or call 911 if deemed necessary. Patient identification was verified at the start of the visit: Yes    Total time spent on this encounter: 15    Services were provided through a video synchronous discussion virtually to substitute for in-person clinic visit. Patient was located at his/her home. Provider was located at his clinic. --Mikayla Farah MD on 5/4/2021 at 1:45 PM    An electronic signature was used to authenticate this note    . NOTE: This report was transcribed using voice recognition software. Every effort was made to ensure accuracy; however, inadvertent computerized transcription errors may be present.

## 2021-05-05 ENCOUNTER — TELEPHONE (OUTPATIENT)
Dept: SURGERY | Age: 46
End: 2021-05-05

## 2021-05-05 NOTE — TELEPHONE ENCOUNTER
MA received a return call from patient in regards to date procedure was on. Patient states that she has an US that day and unable to make it. MA contacted surgery scheduling and spoke to [facility] and rescheduled patient to 05/21/2021 @ 8am. MA contacted patient back with updated date and time. Pt verbalized date and time.         Electronically signed by Yogesh Menjivar MA on 5/5/21 at 9:23 AM EDT

## 2021-05-05 NOTE — TELEPHONE ENCOUNTER
ELIDA Lentz called and spoke to Waqar cotas and scheduled PT for EGD & Colonoscopy on 5/19/2021 @ 9am with Dr. Rafaela Ryder. PT denied taking any ASA/blood thinner products. MA reached patient VM and left detailed message with prep instructions, and NPO after midnight. Also included in VM was appointment date/time, as well as to arrive 1 hours prior to procedure. PT advised PAT will call to go over all medication and advise on where to park and enter the hospital at for procedure. PT has been told to make sure they have a ride to and from procedure as they are not allowed to drive after procedure. PT procedure letter was mailed to them with all instructions also on it. MA instructed PT to call the office at [phone] with any questions, comments, or concerns about the procedure.        Electronically signed by Yogesh Menjivar MA on 5/5/21 at 8:58 AM EDT

## 2021-05-12 ENCOUNTER — TELEPHONE (OUTPATIENT)
Dept: SURGERY | Age: 46
End: 2021-05-12

## 2021-05-12 NOTE — TELEPHONE ENCOUNTER
MA received advisement from Dr Purvi Boston that patient can take both the day prior, and NPO after midnight. MA contacted patient to inform. Patient verbalized understanding.      Electronically signed by Solomon Ramon on 5/12/21 at 10:01 AM EDT
Patient called in stating she picked up her prep for her colonoscopy on 5/21/2021 @ 8:00am, arrival time 7:00am. Patient states that the prep is a split dose, that she is required to take the second dose the morning of the procedure. Patient states she is concerned about the drive to get to the hospital in the morning following the second dose of the prep. Patient is questioning if she can take the prep at a different time to avoid accident on the way to the procedure. MA routing message to Dr Hamilton Magallon for advisement.      Electronically signed by Fransisco Huggins on 5/12/21 at 9:49 AM EDT
Principal Discharge DX:	Leg swelling  Instructions for follow-up, activity and diet:	Return to the ED for any new or worsening symptoms  Take your medication as prescribed  Elevate your legs when seated  Consider compression stockings  Follow up with your PMD in 2-3 days for a recheck  Secondary Diagnosis:	Peripheral edema  Secondary Diagnosis:	Arthritis

## 2021-05-12 NOTE — PROGRESS NOTES
Patient agreed to COVID test on 5/17 at the  [address] [place] [address] between the hours of 6 am- 10 am located at  [address] [place] [address]. Patient instructed to bring ID. Patient instructed to self isolate until day of surgery.

## 2021-05-17 ENCOUNTER — TELEPHONE (OUTPATIENT)
Dept: SURGERY | Age: 46
End: 2021-05-17

## 2021-05-17 NOTE — TELEPHONE ENCOUNTER
MA received a call from pt stating that she has to reschedule her EGD/Colonosocpy with Dr. Warren José on 5/21/21 as she will be out of town for a family emergency. MA routing to [employer] for rescheduling purposes. Pt can be reached at [phone].     Electronically signed by Javy Clayton on 5/17/21 at 8:49 AM EDT

## 2021-05-17 NOTE — TELEPHONE ENCOUNTER
MA received a call from patient who PAT transferred. MA rescheduled patient who requested next week on Mon, Wed or Fri. MA rescheduled her EGD/Colonoscopy to Monday 05/24/2021 @ 10:00am, arrive at 9:00am with Dr. Reagan Jimenes. Patient confirmed date and times. Forwarding to [place], [place] for informational purposes.   Electronically signed by Royal Tanner on 5/17/21 at 2:30 PM EDT

## 2021-05-18 RX ORDER — SODIUM, POTASSIUM,MAG SULFATES 17.5-3.13G
SOLUTION, RECONSTITUTED, ORAL ORAL
Status: ON HOLD | COMMUNITY
Start: 2021-05-04 | End: 2021-05-24 | Stop reason: HOSPADM

## 2021-05-18 NOTE — PROGRESS NOTES
[facility] PRE-ADMISSION TESTING ENDOSCOPY INSTRUCTIONS- [facility]-phone number:[phone]    ENDOSCOPY INSTRUCTIONS:   [x] Bowel prep instructions reviewed. [x] Nothing by mouth after midnight, including gum, candy, mints, or water. Please follow your surgeons instructions if you are required to complete a bowel prep. Colonoscopy- no solid food-only clear liquids the day prior). [x] You may brush your teeth, gargle, but do NOT swallow water. [x] Do not wear makeup, lotions, powders, deodorant. Nail polish as directed by the nurse. [x] Arrange transportation with a responsible adult  to and from the hospital. If you do not have a responsible adult  to transport you, you will need to make arrangements with a medical transportation company (i.e. [employer]. A Uber/taxi/bus is not appropriate unless you are accompanied by a responsible adult ). Arrange for someone to be with you for the remainder of the day and for 24 hours after your procedure due to having had anesthesia. Who will be your  for transportation?___friend_______________   Who will be staying with you for 24 hrs after your procedure?___________friend_______    PARKING INSTRUCTIONS:   [x] Arrival Time:_____0900___________________  · [x] Parking lot  \"I\" OR 1 is located on [facility] (the corner of [facility]). To enter, press the button and the gate will lift. A free token will be provided to exit the lot. One car per patient is allowed to park in this lot. All other cars are to park on [address] [place] [address] either in the parking garage or the handicap lot. [] To reach the [facility] lobby from [address] [place] [address], upon entering the hospital, take elevator B to the 3rd floor.     EDUCATION INSTRUCTIONS:  [] Bring a complete list of your medications, please write the last time you took the medicine, give this list to the nurse.  [] Take the following medications the morning of surgery with 1-2 ounces of water:   [x] Stop herbal supplements and vitamins 5 days before your surgery. [] DO NOT take any diabetic medicine the morning of surgery. Follow instructions for insulin the day before surgery. [] If you are diabetic and your blood sugar is low or you feel symptomatic, you may drink 1-2 ounces of apple juice or take a glucose tablet. The morning of your procedure, you may call the pre-op area if you have concerns about your blood sugar [phone]. [] Use your inhalers the morning of surgery. Bring your emergency inhaler with you day of surgery. [] Follow physician instructions regarding any blood thinners you may be taking. WHAT TO EXPECT:  [x] The day of your procedure you will be greeted and checked in by the [employer].  In addition, you will be registered in the [facility] by a Patient Access Representative. Please bring your photo ID and insurance card. A nurse will greet you in accordance to the time you are needed in the pre-op area to prepare you for surgery. Please do not be discouraged if you are not greeted in the order you arrive as there are many variables that are involved in patient preparation. Your patience is greatly appreciated as you wait for your nurse. Please bring in items such as: books, magazines, newspapers, electronics, or any other items  to occupy your time in the waiting area. []  Delays may occur. Staff will make a sincere effort to keep you informed of delays. If any delays occur with your procedure, we apologize ahead of time for your inconvenience as we recognize the value of your time.

## 2021-05-19 ENCOUNTER — HOSPITAL ENCOUNTER (OUTPATIENT)
Dept: ULTRASOUND IMAGING | Age: 46
Discharge: HOME | End: 2021-05-21
Payer: COMMERCIAL

## 2021-05-19 ENCOUNTER — HOSPITAL ENCOUNTER (OUTPATIENT)
Age: 46
Discharge: HOME | End: 2021-05-21
Payer: COMMERCIAL

## 2021-05-19 DIAGNOSIS — U07.1 COVID-19: ICD-10-CM

## 2021-05-19 DIAGNOSIS — E04.1 THYROID NODULE: ICD-10-CM

## 2021-05-19 PROCEDURE — U0003 INFECTIOUS AGENT DETECTION BY NUCLEIC ACID (DNA OR RNA); SEVERE ACUTE RESPIRATORY SYNDROME CORONAVIRUS 2 (SARS-COV-2) (CORONAVIRUS DISEASE [COVID-19]), AMPLIFIED PROBE TECHNIQUE, MAKING USE OF HIGH THROUGHPUT TECHNOLOGIES AS DESCRIBED BY CMS-2020-01-R: HCPCS

## 2021-05-19 PROCEDURE — U0005 INFEC AGEN DETEC AMPLI PROBE: HCPCS

## 2021-05-19 PROCEDURE — 76536 US EXAM OF HEAD AND NECK: CPT

## 2021-05-21 LAB — SARS-COV-2, PCR: NOT DETECTED

## 2021-05-22 ENCOUNTER — ANESTHESIA EVENT (OUTPATIENT)
Dept: ENDOSCOPY | Age: 46
End: 2021-05-22
Payer: COMMERCIAL

## 2021-05-24 ENCOUNTER — HOSPITAL ENCOUNTER (OUTPATIENT)
Age: 46
Setting detail: OUTPATIENT SURGERY
Discharge: HOME | End: 2021-05-24
Attending: SURGERY | Admitting: SURGERY
Payer: COMMERCIAL

## 2021-05-24 ENCOUNTER — ANESTHESIA (OUTPATIENT)
Dept: ENDOSCOPY | Age: 46
End: 2021-05-24
Payer: COMMERCIAL

## 2021-05-24 VITALS
BODY MASS INDEX: 21.5 KG/M2 | RESPIRATION RATE: 18 BRPM | HEIGHT: 67 IN | DIASTOLIC BLOOD PRESSURE: 62 MMHG | WEIGHT: 137 LBS | OXYGEN SATURATION: 99 % | HEART RATE: 49 BPM | SYSTOLIC BLOOD PRESSURE: 140 MMHG | TEMPERATURE: 98 F

## 2021-05-24 VITALS
SYSTOLIC BLOOD PRESSURE: 114 MMHG | DIASTOLIC BLOOD PRESSURE: 66 MMHG | RESPIRATION RATE: 22 BRPM | OXYGEN SATURATION: 98 %

## 2021-05-24 DIAGNOSIS — U07.1 COVID-19: Primary | ICD-10-CM

## 2021-05-24 PROBLEM — R10.84 GENERALIZED ABDOMINAL PAIN: Status: ACTIVE | Noted: 2021-05-24

## 2021-05-24 PROCEDURE — 43239 EGD BIOPSY SINGLE/MULTIPLE: CPT | Performed by: SURGERY

## 2021-05-24 PROCEDURE — 3609010300 HC COLONOSCOPY W/BIOPSY SINGLE/MULTIPLE: Performed by: SURGERY

## 2021-05-24 PROCEDURE — 2580000003 HC RX 258: Performed by: SURGERY

## 2021-05-24 PROCEDURE — 88305 TISSUE EXAM BY PATHOLOGIST: CPT

## 2021-05-24 PROCEDURE — 3700000001 HC ADD 15 MINUTES (ANESTHESIA): Performed by: SURGERY

## 2021-05-24 PROCEDURE — 2709999900 HC NON-CHARGEABLE SUPPLY: Performed by: SURGERY

## 2021-05-24 PROCEDURE — 45380 COLONOSCOPY AND BIOPSY: CPT | Performed by: SURGERY

## 2021-05-24 PROCEDURE — 88342 IMHCHEM/IMCYTCHM 1ST ANTB: CPT

## 2021-05-24 PROCEDURE — 6360000002 HC RX W HCPCS: Performed by: NURSE ANESTHETIST, CERTIFIED REGISTERED

## 2021-05-24 PROCEDURE — 3700000000 HC ANESTHESIA ATTENDED CARE: Performed by: SURGERY

## 2021-05-24 PROCEDURE — 3609012400 HC EGD TRANSORAL BIOPSY SINGLE/MULTIPLE: Performed by: SURGERY

## 2021-05-24 PROCEDURE — 7100000011 HC PHASE II RECOVERY - ADDTL 15 MIN: Performed by: SURGERY

## 2021-05-24 PROCEDURE — 7100000010 HC PHASE II RECOVERY - FIRST 15 MIN: Performed by: SURGERY

## 2021-05-24 RX ORDER — SODIUM CHLORIDE 9 MG/ML
25 INJECTION, SOLUTION INTRAVENOUS PRN
Status: DISCONTINUED | OUTPATIENT
Start: 2021-05-24 | End: 2021-05-24 | Stop reason: HOSPADM

## 2021-05-24 RX ORDER — SODIUM CHLORIDE 0.9 % (FLUSH) 0.9 %
10 SYRINGE (ML) INJECTION PRN
Status: DISCONTINUED | OUTPATIENT
Start: 2021-05-24 | End: 2021-05-24 | Stop reason: HOSPADM

## 2021-05-24 RX ORDER — PROPOFOL 10 MG/ML
INJECTION, EMULSION INTRAVENOUS PRN
Status: DISCONTINUED | OUTPATIENT
Start: 2021-05-24 | End: 2021-05-24 | Stop reason: SDUPTHER

## 2021-05-24 RX ORDER — SODIUM CHLORIDE 9 MG/ML
INJECTION, SOLUTION INTRAVENOUS CONTINUOUS
Status: DISCONTINUED | OUTPATIENT
Start: 2021-05-24 | End: 2021-05-24 | Stop reason: HOSPADM

## 2021-05-24 RX ORDER — SODIUM CHLORIDE 0.9 % (FLUSH) 0.9 %
10 SYRINGE (ML) INJECTION EVERY 12 HOURS SCHEDULED
Status: DISCONTINUED | OUTPATIENT
Start: 2021-05-24 | End: 2021-05-24 | Stop reason: HOSPADM

## 2021-05-24 RX ADMIN — PROPOFOL 400 MG: 10 INJECTION, EMULSION INTRAVENOUS at 10:32

## 2021-05-24 RX ADMIN — SODIUM CHLORIDE: 9 INJECTION, SOLUTION INTRAVENOUS at 10:22

## 2021-05-24 ASSESSMENT — PAIN SCALES - GENERAL
PAINLEVEL_OUTOF10: 0
PAINLEVEL_OUTOF10: 0

## 2021-05-24 NOTE — PROGRESS NOTES
Patient admitted to [facility], placed on all appropriate monitors, all siderails up, cart locked,bed in low position.

## 2021-05-24 NOTE — ANESTHESIA PRE PROCEDURE
Department of Anesthesiology  Preprocedure Note       Name:  Magdiel Harp   Age:  41 y.o.  :  1975                                          MRN:  [id number]         Date:  2021      Surgeon: Rufina De La Rosa):  Edenilson Chaudhry MD    Procedure: Procedure(s):  EGD BIOPSY  COLONOSCOPY DIAGNOSTIC    Medications prior to admission:   Prior to Admission medications    Medication Sig Start Date End Date Taking? Authorizing Provider   SUPREP BOWEL PREP KIT 17.5-3.13-1.6 GM/177ML SOLN USE AS DIRECTED BY MOUTH ONCE FOR 1 DOSE 21  Yes Historical Provider, MD   BIOTIN PO Take 600 mcg by mouth daily    Historical Provider, MD       Current medications:    Current Facility-Administered Medications   Medication Dose Route Frequency Provider Last Rate Last Admin    0.9 % sodium chloride infusion   Intravenous Continuous Edenilson Chaudhry MD        0.9 % sodium chloride infusion  25 mL Intravenous PRN Edenilson Chaudhry MD        sodium chloride flush 0.9 % injection 10 mL  10 mL Intravenous 2 times per day Edenilson Chaudhry MD        sodium chloride flush 0.9 % injection 10 mL  10 mL Intravenous PRN Edenilson Chaudhry MD           Allergies:     Allergies   Allergen Reactions    Codeine Nausea And Vomiting       Problem List:    Patient Active Problem List   Diagnosis Code    Celiac disease K90.0    Hypoparathyroidism ([address] E20.9    Iron deficiency E61.1    Thyroid nodule E04.1    Chronic diarrhea K52.9    Vitiligo L80       Past Medical History:        Diagnosis Date    Celiac disease     Colitis     lymphcytic    Hypoparathyroidism ([address]     Iron deficiency anemia     Dr. Tiffanie London infusions years ago    Prolonged emergence from general anesthesia     Vitiligo        Past Surgical History:        Procedure Laterality Date    COLONOSCOPY      h/o polyps    ENDOMETRIAL ABLATION  2016    ESOPHAGEAL MOTILITY STUDY N/A 2019    ESOPHAGEAL MOTILITY/MANOMETRY STUDY performed by Lam Lobato MD at [address] [place] [address]

## 2021-05-25 NOTE — ANESTHESIA POSTPROCEDURE EVALUATION
Department of Anesthesiology  Postprocedure Note    Patient: Belkys Dumont  MRN: [id number]  YOB: 1975  Date of evaluation: 5/24/2021  Time:  9:57 PM     Procedure Summary     Date: 05/24/21 Room / Location: [address] [place] [address] [place] [facility]    Anesthesia Start: 1031 Anesthesia Stop: 1101    Procedures:       EGD BIOPSY (N/A )      COLONOSCOPY WITH BIOPSY (N/A ) Diagnosis: (ABDOMINAL PAIN, DIARRHEA)    Surgeons: Martha Schlatter, MD Responsible Provider: Lorena Randall DO    Anesthesia Type: MAC ASA Status: 3          Anesthesia Type: MAC    Bety Phase I:      Bety Phase II: Bety Score: 10    Last vitals: Reviewed and per EMR flowsheets.        Anesthesia Post Evaluation    Patient location during evaluation: PACU  Patient participation: complete - patient participated  Level of consciousness: awake and alert  Pain score: 1  Airway patency: patent  Nausea & Vomiting: no nausea and no vomiting  Complications: no  Cardiovascular status: hemodynamically stable  Respiratory status: acceptable  Hydration status: euvolemic

## 2021-05-28 ENCOUNTER — TELEPHONE (OUTPATIENT)
Dept: INTERNAL MEDICINE | Age: 46
End: 2021-05-28

## 2021-05-28 DIAGNOSIS — A04.8 H. PYLORI INFECTION: Primary | ICD-10-CM

## 2021-05-28 RX ORDER — CLARITHROMYCIN 500 MG/1
500 TABLET, COATED ORAL 2 TIMES DAILY
Qty: 28 TABLET | Refills: 0 | Status: SHIPPED | OUTPATIENT
Start: 2021-05-28 | End: 2021-06-11

## 2021-05-28 RX ORDER — OMEPRAZOLE 20 MG/1
20 CAPSULE, DELAYED RELEASE ORAL 2 TIMES DAILY
Qty: 30 CAPSULE | Refills: 0 | Status: SHIPPED
Start: 2021-05-28 | End: 2021-06-16 | Stop reason: ALTCHOICE

## 2021-05-28 RX ORDER — AMOXICILLIN 500 MG/1
1000 CAPSULE ORAL 2 TIMES DAILY
Qty: 56 CAPSULE | Refills: 0 | Status: SHIPPED | OUTPATIENT
Start: 2021-05-28 | End: 2021-06-11

## 2021-05-28 NOTE — TELEPHONE ENCOUNTER
Patient called re:  H. Pylori + result on EGD biopsy. Patient also with duodenitis and gastritis. Discussed the need to treat the H. Pylori as this is considered a carcinogen and can lead to gastric ulcer disease. Will need triple therapy for 14 days (omeprazole/clarithromycin/amoxicillin). I have also advised to start a probiotic as well. Dorinda Gerardo voiced understanding of this. Will need to confirm eradication with fecal Ag test at least 4 weeks after treatment is completed. Antibiotics and PPI prescribed to pharmacy. Will monitor symptoms and then decide on next steps of therapy if no improvement.         Adrienne Humphrey MD  5/28/2021

## 2021-06-02 ENCOUNTER — TELEPHONE (OUTPATIENT)
Dept: SURGERY | Age: 46
End: 2021-06-02

## 2021-06-02 NOTE — TELEPHONE ENCOUNTER
MA received call from patient inregards to wanting to know if she can have results from EGD and Colonosocpy. Patient states Dontamela Cardenas advised her she would get a call 3-5 days after scopes and hasnt received anything. MA advised she would send a message to Don Cardenas. MA routing message to Don Cardenas for advisement. Pt can be reached at [phone].       Electronically signed by Gonzalez Davila MA on 6/2/21 at 8:53 AM EDT

## 2021-06-08 NOTE — TELEPHONE ENCOUNTER
Reviewed normal endoscopic and colonoscopic findings with her as well as biopsy reports. She has already started treatment for Helicobacter Pylori from Dr. Florencio Medina. F/U with surgery as needed.

## 2021-06-16 ENCOUNTER — HOSPITAL ENCOUNTER (OUTPATIENT)
Age: 46
Discharge: HOME | End: 2021-06-16
Payer: COMMERCIAL

## 2021-06-16 ENCOUNTER — OFFICE VISIT (OUTPATIENT)
Dept: INTERNAL MEDICINE | Age: 46
End: 2021-06-16
Payer: COMMERCIAL

## 2021-06-16 VITALS
TEMPERATURE: 98.3 F | WEIGHT: 133 LBS | SYSTOLIC BLOOD PRESSURE: 130 MMHG | HEIGHT: 67 IN | HEART RATE: 65 BPM | OXYGEN SATURATION: 100 % | BODY MASS INDEX: 20.88 KG/M2 | RESPIRATION RATE: 16 BRPM | DIASTOLIC BLOOD PRESSURE: 82 MMHG

## 2021-06-16 DIAGNOSIS — R19.7 DIARRHEA, UNSPECIFIED TYPE: Primary | ICD-10-CM

## 2021-06-16 DIAGNOSIS — R19.7 DIARRHEA, UNSPECIFIED TYPE: ICD-10-CM

## 2021-06-16 LAB
ANION GAP SERPL CALCULATED.3IONS-SCNC: 7 MMOL/L (ref 7–16)
BUN BLDV-MCNC: 9 MG/DL (ref 6–20)
CALCIUM SERPL-MCNC: 9.6 MG/DL (ref 8.6–10.2)
CHLORIDE BLD-SCNC: 104 MMOL/L (ref 98–107)
CO2: 27 MMOL/L (ref 22–29)
CREAT SERPL-MCNC: 1 MG/DL (ref 0.5–1)
GFR AFRICAN AMERICAN: >60
GFR NON-AFRICAN AMERICAN: 60 ML/MIN/1.73
GLUCOSE BLD-MCNC: 92 MG/DL (ref 74–99)
POTASSIUM SERPL-SCNC: 4.2 MMOL/L (ref 3.5–5)
SODIUM BLD-SCNC: 138 MMOL/L (ref 132–146)

## 2021-06-16 PROCEDURE — 99212 OFFICE O/P EST SF 10 MIN: CPT | Performed by: INTERNAL MEDICINE

## 2021-06-16 PROCEDURE — 99213 OFFICE O/P EST LOW 20 MIN: CPT | Performed by: INTERNAL MEDICINE

## 2021-06-16 PROCEDURE — 80048 BASIC METABOLIC PNL TOTAL CA: CPT

## 2021-06-16 PROCEDURE — 36415 COLL VENOUS BLD VENIPUNCTURE: CPT

## 2021-07-08 ENCOUNTER — HOSPITAL ENCOUNTER (OUTPATIENT)
Age: 46
Setting detail: SPECIMEN
Discharge: HOME | End: 2021-07-08
Payer: COMMERCIAL

## 2021-07-08 DIAGNOSIS — R19.7 DIARRHEA, UNSPECIFIED TYPE: ICD-10-CM

## 2021-07-08 PROCEDURE — 87338 HPYLORI STOOL AG IA: CPT

## 2021-07-11 ENCOUNTER — TELEPHONE (OUTPATIENT)
Dept: INTERNAL MEDICINE | Age: 46
End: 2021-07-11

## 2021-07-11 LAB — H PYLORI ANTIGEN STOOL: NEGATIVE

## 2021-07-11 RX ORDER — ONDANSETRON 4 MG/1
4 TABLET, FILM COATED ORAL 3 TIMES DAILY PRN
Qty: 15 TABLET | Refills: 0 | Status: SHIPPED | OUTPATIENT
Start: 2021-07-11

## 2021-07-13 NOTE — PROGRESS NOTES
[facility] Internal Medicine      SUBJECTIVE:  Martin Reed (:  1975) is a 41 y.o. female here for evaluation of the following chief complaint(s):  Hypertension (BP check)  Over the weekend, worsening symptoms of diarrhea and nausea. Prescribed Zofran. States that she feels \"spent. \"  Still having diarrhea and is frustrated that nothing seems to improve these symptoms and they have been ongoing in the past. Repeat H. Pylori testing is negative after treatment. Celine Gutierres inquired about whether a biologic agent would be helpful. I stated that I  am not able to adequately answer this as at this point but would recommend the opinion of a gastroenterologist.  I also inquired about her previous response to steroids. Previous courses of steroids have alleviated her symptoms but they return after she stops them. Was advised by previous GI specialists that long-term steroids were not indicated. We discussed an opinion from GI. Celine Gutierres prefers someone more local.  After discussion, Celine Gutierres is agreeable to seeing Dr. Michael Yarbrough. I will place this referral and discuss the course with Dr. Anastacia Garcia. Review of Systems    Current Outpatient Medications on File Prior to Visit   Medication Sig Dispense Refill    ondansetron (ZOFRAN) 4 MG tablet Take 1 tablet by mouth 3 times daily as needed for Nausea or Vomiting 15 tablet 0     No current facility-administered medications on file prior to visit. OBJECTIVE:    VS:   Vitals:    21 1004   BP: 124/84   Pulse: 68   Resp: 16   Temp: 98.2 °F (36.8 °C)   TempSrc: Oral   Weight: 131 lb (59.4 kg)   Height: 5' 7\" (1.702 m)     Physical Exam       ASSESSMENT/PLAN:  1. Chronic diarrhea  2. Celiac disease    Ongoing symptoms. Refer to Dr. Anastacia Garcia at [facility]. RTC:  Return in about 2 months (around 2021).       Diallo Dominguez MD   2021 2:51 PM

## 2021-07-14 ENCOUNTER — OFFICE VISIT (OUTPATIENT)
Dept: INTERNAL MEDICINE | Age: 46
End: 2021-07-14
Payer: COMMERCIAL

## 2021-07-14 VITALS
HEART RATE: 68 BPM | HEIGHT: 67 IN | WEIGHT: 131 LBS | BODY MASS INDEX: 20.56 KG/M2 | TEMPERATURE: 98.2 F | DIASTOLIC BLOOD PRESSURE: 84 MMHG | SYSTOLIC BLOOD PRESSURE: 124 MMHG | RESPIRATION RATE: 16 BRPM

## 2021-07-14 DIAGNOSIS — K90.0 CELIAC DISEASE: ICD-10-CM

## 2021-07-14 DIAGNOSIS — K52.9 CHRONIC DIARRHEA: Primary | ICD-10-CM

## 2021-07-14 PROCEDURE — 99213 OFFICE O/P EST LOW 20 MIN: CPT | Performed by: INTERNAL MEDICINE

## 2021-07-14 PROCEDURE — 99212 OFFICE O/P EST SF 10 MIN: CPT | Performed by: INTERNAL MEDICINE

## 2021-07-14 NOTE — PATIENT INSTRUCTIONS
-You will receive a call to schedule your referral with Dr. Aleja Joseph.   -Please return in 2 months.       If you have any issues or concerns please give our office a call [phone]

## 2021-08-05 ENCOUNTER — TELEPHONE (OUTPATIENT)
Dept: INTERNAL MEDICINE | Age: 46
End: 2021-08-05

## 2021-08-05 DIAGNOSIS — K90.0 CELIAC DISEASE: Primary | ICD-10-CM

## 2021-08-05 NOTE — TELEPHONE ENCOUNTER
Spoke to Arcelia from [facility] Dr. Johnson Spotted office. The patient has not yet been scheduled. She is going to check on this and call patient and then let me know when she is scheduled.

## 2021-08-05 NOTE — TELEPHONE ENCOUNTER
Elkin Bee from [facility] called back. Patient told their office she scheduled with another provider. She did not say who. Referral closed.

## 2021-08-06 NOTE — TELEPHONE ENCOUNTER
Patient was referred to Dr. Caity Carranza but cannot be seen until mid-September. Will refer to Sudeep Baker provider who will be starting this month. Willow Palencia is in agreement. Referral placed.      Stef Miller MD  8/5/2021

## 2021-08-06 NOTE — TELEPHONE ENCOUNTER
I will print the referral, also do you know who she is seeing because she told Dr. Wilberto Fuentes that she was seeing another GI doctor?

## 2021-08-10 ENCOUNTER — TELEPHONE (OUTPATIENT)
Dept: GASTROENTEROLOGY | Age: 46
End: 2021-08-10

## 2021-08-10 NOTE — TELEPHONE ENCOUNTER
Called and spoke with patient regarding new patient referral from Dr. Mariangel Hall. Scheduled pt with Dr. Omero Blackburn on 08/18/2021 in [place] office. Pt verbalized understanding.    Electronically signed by Grady Meadows MA on 8/10/2021 at 11:14 AM

## 2021-08-18 ENCOUNTER — INITIAL CONSULT (OUTPATIENT)
Dept: GASTROENTEROLOGY | Age: 46
End: 2021-08-18
Payer: COMMERCIAL

## 2021-08-18 VITALS
WEIGHT: 130 LBS | SYSTOLIC BLOOD PRESSURE: 123 MMHG | BODY MASS INDEX: 20.4 KG/M2 | TEMPERATURE: 97.6 F | HEIGHT: 67 IN | DIASTOLIC BLOOD PRESSURE: 90 MMHG | HEART RATE: 88 BPM | RESPIRATION RATE: 18 BRPM | OXYGEN SATURATION: 95 %

## 2021-08-18 DIAGNOSIS — K52.832 LYMPHOCYTIC COLITIS: ICD-10-CM

## 2021-08-18 DIAGNOSIS — Z86.19 HISTORY OF HELICOBACTER PYLORI INFECTION: ICD-10-CM

## 2021-08-18 DIAGNOSIS — R10.9 CHRONIC ABDOMINAL PAIN: ICD-10-CM

## 2021-08-18 DIAGNOSIS — K90.0 CELIAC DISEASE: ICD-10-CM

## 2021-08-18 DIAGNOSIS — G89.29 CHRONIC ABDOMINAL PAIN: ICD-10-CM

## 2021-08-18 DIAGNOSIS — K52.9 CHRONIC DIARRHEA: Primary | ICD-10-CM

## 2021-08-18 PROCEDURE — 99203 OFFICE O/P NEW LOW 30 MIN: CPT | Performed by: STUDENT IN AN ORGANIZED HEALTH CARE EDUCATION/TRAINING PROGRAM

## 2021-08-18 RX ORDER — NORTRIPTYLINE HYDROCHLORIDE 10 MG/1
10 CAPSULE ORAL NIGHTLY
Qty: 30 CAPSULE | Refills: 3 | Status: SHIPPED
Start: 2021-08-18 | End: 2021-09-08

## 2021-08-18 NOTE — PROGRESS NOTES
Sabas Hernadez (:  1975) is a 40 y.o. female,New patient, here for evaluation of the following chief complaint(s):  New Patient (Celiac's disease)         ASSESSMENT/PLAN:    1. Chronic Diarrhea:  Patient has a history of microscopic colitis treated with budesonide in the past. Biopsies from the most recent colonoscopy (May 2021) are negative for persistent disease or evidence of IBD. On random biopsy, non-specific focal active colitis appreciated. Will obtain KUB today to assess for significant stool burden that could lead to overflow diarrhea. If present, will manage with bowel prep for bowel evacuation prior to starting daily bowel regimen. If KUB within normal limits, will begin nortriptyline 10mg qPM to begin treatment strategy for likely IBS w/ diarrhea. Patient is already on gluten free diet but we will discuss beginning Low FODMAP diet at the next clinic visit. 2. Celiac Disease:  Last anti-tTG obtained in May 2020 was within normal limits. EGD in May 2021 with peptic duodenitis but no suggestion of active inflammation from Celiac. She will remain on strict gluten-free diet. Will obtain IgA level and anti-tTG level at next visit. 3. Danni-umbilical abdominal pain:  Laparoscopy umbilical trochar entrance scar in the region of pain though negative Carnett's sign on exam argues against trigger point/nerve entrapment. Exam overall negative with no concerning findings. Will monitor. 4. History of Lymphocytic Colitis  Colonoscopy May 2021 with random biopsies negative for persistent microscopic/lymphocytic colitis. 5. History of H. Pylori  Treated after positive biopsy from EGD May 2021. Stool antigen tested after completion therapy while no longer on PPI therapy and was negative. At this point considered eradicated. Return in about 6 weeks (around 2021) for follow up after treatment plan.          Subjective   SUBJECTIVE/OBJECTIVE:  HPI     Per Chart Review:    2012: Diagnosed with Celiac Disease and begins gluten free diet. November 2019: EGD with duodenal bulb biopsies showing villous blunting consistent with active Celiac. November 2019: Empiric dilation of esophagus for symptoms of dysphagia. November 2019: CT A/P normal with no acute findings. December 2019: HREM with normal esophageal motility. December 2019: UGI w/ SBFT consistent with GERD. January 2020: CT Lung with non-specific findings. May 2021: EGD and Colonoscopy performed with normal appearing mucosa throughout. Gastric biopsies positive for H pylori s/p treatment w/ confirmed eradication July 2021 off of PPI therapy. Duodenal biopsies consistent with peptic duodenitis but no findings consistent with active Celiac disease. Random colonic biopsies negative for lymphocytic colitis or chronic colitis though there were areas of focal active colitis. During the clinic visit, the patient states that she continues having altered bowel habits that are mostly liquid in consistency. She reports that it has been weeks since she passed a fully solid stool. Bowel movements do not wake her from sleep and she denies the presence of blood in her stool. She states that she has strictly adhered to a gluten-free diet. She sometimes has bloating/reflux, but mentions these symptoms are largely controlled despite not being on PPI therapy. She is no longer having symptoms of dysphagia. She reports that she had an improvement in dysphagia previously with the use of amitriptyline. The patient also has a L-sided kwasi-umbilical pain that she reports is tender to the touch and with certain movements. The pain is not associated with bowel movements. ROS:  General: Patient denies n/v/f/c or weight loss. HEENT: Patient denies persistent postnasal drip, scleral icterus, drooling, persistent bleeding from nose/mouth. Resp: Patient denies SOB, wheezing, productive cough.   Cards: Patient denies CP, palpitations, significant

## 2021-08-18 NOTE — PATIENT INSTRUCTIONS
Patient Education        Irritable Bowel Syndrome: Care Instructions  Your Care Instructions  Irritable bowel syndrome, or IBS, is a problem with the intestines that causes belly pain, bloating, gas, constipation, and diarrhea. The cause of IBS is not well known. IBS can last for many years, but it does not get worse over time or lead to serious disease. Most people can control their symptoms by changing their diet and reducing stress. Follow-up care is a key part of your treatment and safety. Be sure to make and go to all appointments, and call your doctor if you are having problems. It's also a good idea to know your test results and keep a list of the medicines you take. How can you care for yourself at home? · Prevent diarrhea:  ? Limit the amount of high-fiber foods you eat. This includes vegetables, fruits, whole-grain breads and pasta, high-fiber cereal, and brown rice. ? Limit dairy products. ? Limit artificial sweeteners such as sorbitol and xylitol. · Avoid constipation:  ? Include fruits, vegetables, beans, and whole grains in your diet each day. These foods are high in fiber. ? Drink plenty of fluids. If you have kidney, heart, or liver disease and have to limit fluids, talk with your doctor before you increase the amount of fluids you drink. ? Get some exercise every day. Build up slowly to 30 to 60 minutes a day on 5 or more days of the week. ? Take a fiber supplement, such as Citrucel or Metamucil, every day if needed. Read and follow all instructions on the label. ? Schedule time each day for a bowel movement. Having a daily routine may help. Take your time and do not strain when having a bowel movement. · To help relieve bloating or gas, avoid foods such as beans, cabbage, cauliflower, or broccoli. · Keep a daily diary of what you eat and what symptoms you have. This may help find foods that cause you problems. · Eat slowly. Try to make mealtime relaxing.   · Find ways to reduce Curettage Text: The wound bed was treated with curettage after the biopsy was performed. Silver Nitrate Text: The wound bed was treated with silver nitrate after the biopsy was performed. Anesthesia Volume In Cc (Will Not Render If 0): 0.5 Render Post-Care Instructions In Note?: yes Dressing: bandage Destruction After The Procedure: No Cryotherapy Text: The wound bed was treated with cryotherapy after the biopsy was performed. Biopsy Method: Double edge Personna blades Hemostasis: Aluminum Chloride Size Of Lesion In Cm: 1 Detail Level: Detailed Type Of Destruction Used: Curettage Post-Care Instructions: I reviewed with the patient in detail post-care instructions. Electrodesiccation And Curettage Text: The wound bed was treated with electrodesiccation and curettage after the biopsy was performed. Electrodesiccation Text: The wound bed was treated with electrodesiccation after the biopsy was performed. Consent: Verbal consent was obtained and risks were reviewed including but not limited to scarring, infection, bleeding, scabbing, incomplete removal, nerve damage and allergy to anesthesia. Additional Anesthesia Volume In Cc (Will Not Render If 0): 0 Anesthesia Type: 1% Xylocaine without epinephrine Biopsy Type: H and E Billing Type: Third-Party Bill Wound Care: [employer] Notification Instructions: Patient will follow up for biopsy results x 2-4 weeks

## 2021-09-08 ENCOUNTER — OFFICE VISIT (OUTPATIENT)
Dept: INTERNAL MEDICINE | Age: 46
End: 2021-09-08
Payer: COMMERCIAL

## 2021-09-08 VITALS
HEART RATE: 70 BPM | DIASTOLIC BLOOD PRESSURE: 83 MMHG | HEIGHT: 67 IN | WEIGHT: 135 LBS | SYSTOLIC BLOOD PRESSURE: 120 MMHG | TEMPERATURE: 97.7 F | RESPIRATION RATE: 18 BRPM | BODY MASS INDEX: 21.19 KG/M2

## 2021-09-08 DIAGNOSIS — E04.1 THYROID NODULE: Primary | ICD-10-CM

## 2021-09-08 PROCEDURE — 99213 OFFICE O/P EST LOW 20 MIN: CPT | Performed by: INTERNAL MEDICINE

## 2021-09-08 PROCEDURE — 99212 OFFICE O/P EST SF 10 MIN: CPT | Performed by: INTERNAL MEDICINE

## 2021-09-08 RX ORDER — VENLAFAXINE HYDROCHLORIDE 37.5 MG/1
18.75 TABLET, EXTENDED RELEASE ORAL
Qty: 30 TABLET | Refills: 1 | Status: SHIPPED
Start: 2021-09-08 | End: 2021-10-05 | Stop reason: DRUGHIGH

## 2021-09-08 NOTE — PROGRESS NOTES
AVS and lab/xray orders printed and patient discharged by Dr. Bobo Coffman nurse
weeks (around 10/6/2021) for Phone visit.     Bhumika Junior MD   9/10/2021 12:55 PM

## 2021-09-09 ENCOUNTER — TELEPHONE (OUTPATIENT)
Dept: INTERNAL MEDICINE | Age: 46
End: 2021-09-09

## 2021-09-09 NOTE — TELEPHONE ENCOUNTER
----- Message from Dejon Fink sent at 9/8/2021  4:24 PM EDT -----  Subject: Medication Problem    QUESTIONS  Name of Medication? venlafaxine 37.5 MG extended release tablet  Patient-reported dosage and instructions? 37.5mg  What question or problem do you have with the medication? Canana cristina [place] with [facility] calling , medication sent over for ER to cut in half. She states   this is not intended to cut in half. Please clarify. Preferred Pharmacy? [facility] [address] [place] [address], [address] [place] [address] [place] [address] phone number (if available)? [phone]  Additional Information for Provider?   ---------------------------------------------------------------------------  --------------  [address]  What is the best way for the office to contact you? Do not leave any   message, patient will call back for answer  Preferred Call Back Phone Number? [phone]  ---------------------------------------------------------------------------  --------------  SCRIPT ANSWERS  Relationship to Patient? Third Party  Representative Name?  SereneSaul Enriquez

## 2021-10-05 ENCOUNTER — OFFICE VISIT (OUTPATIENT)
Dept: INTERNAL MEDICINE | Age: 46
End: 2021-10-05
Payer: COMMERCIAL

## 2021-10-05 ENCOUNTER — CLINICAL DOCUMENTATION (OUTPATIENT)
Dept: INTERNAL MEDICINE | Age: 46
End: 2021-10-05

## 2021-10-05 VITALS
HEART RATE: 68 BPM | HEIGHT: 67 IN | SYSTOLIC BLOOD PRESSURE: 114 MMHG | DIASTOLIC BLOOD PRESSURE: 89 MMHG | WEIGHT: 133 LBS | TEMPERATURE: 97.3 F | BODY MASS INDEX: 20.88 KG/M2 | RESPIRATION RATE: 16 BRPM

## 2021-10-05 DIAGNOSIS — F41.9 ANXIETY: Primary | ICD-10-CM

## 2021-10-05 DIAGNOSIS — R22.1 NODULE OF NECK: ICD-10-CM

## 2021-10-05 PROCEDURE — 99213 OFFICE O/P EST LOW 20 MIN: CPT | Performed by: INTERNAL MEDICINE

## 2021-10-05 PROCEDURE — 99212 OFFICE O/P EST SF 10 MIN: CPT | Performed by: INTERNAL MEDICINE

## 2021-10-05 RX ORDER — VENLAFAXINE HYDROCHLORIDE 37.5 MG/1
37.5 TABLET, EXTENDED RELEASE ORAL NIGHTLY
Qty: 30 TABLET | Refills: 1 | Status: SHIPPED
Start: 2021-10-05 | End: 2021-12-06

## 2021-10-05 RX ORDER — VENLAFAXINE HYDROCHLORIDE 37.5 MG/1
37.5 TABLET, EXTENDED RELEASE ORAL NIGHTLY
Qty: 30 TABLET | Refills: 1 | Status: SHIPPED
Start: 2021-10-05 | End: 2021-10-05 | Stop reason: SDUPTHER

## 2021-10-05 NOTE — PROGRESS NOTES
[facility] Internal Medicine      SUBJECTIVE:  Elli Benitez (:  1975) is a 40 y.o. female here for evaluation of the following chief complaint(s):  Medication Check  Follow-up of anxiety and venlafaxine -waking up with panic attacks. Feeling nauseated and getting dizzy. No change in mid-epigastric pain. Still with diarrhea. These panic attacks last approximately 20 minutes. Still a bit shaky and jittery when waking up in the middle of the night. (this was occurring prior to venlafaxine. Patient attributes some of her GI complaints to her ongoing anxiety. I also believe this anxiety contributes. We discussed increasing dose of venlafaxine while ensuring she is taking this at night. Increase dose of venlafaxine to 37.5 mg daily and monitor for side effects. Will re-evaluate in one month. Thyroid nodule -  I discussed this with Dr. Curtis Cosby. Recommend a recheck in one month with US. If any change, will refer for further evaluation. US of neck/soft tissue and thyroid ordered. Had break through menstrual bleeding after having an ablation and is following with GYN on 2021. Await GYN evaluation    Review of Systems as above    Current Outpatient Medications on File Prior to Visit   Medication Sig Dispense Refill    ondansetron (ZOFRAN) 4 MG tablet Take 1 tablet by mouth 3 times daily as needed for Nausea or Vomiting 15 tablet 0     No current facility-administered medications on file prior to visit. OBJECTIVE:    VS:   Vitals:    10/05/21 0903   BP: 114/89   Pulse: 68   Resp: 16   Temp: 97.3 °F (36.3 °C)   TempSrc: Temporal   Weight: 133 lb (60.3 kg)   Height: 5' 7\" (1.702 m)     Physical Exam   Lungs:  CTA B  Neck:   No carotid bruits appreciated B.   CVS:  +s1/s2 without m/g/r appreciated. Abd:  + BS, NTND, No renal or aortic bruits, diffusely tender but no guarding.    Extr:  2+ DP/PT pulses B, no pitting edema    RTC:  Return in about 1 month (around 11/5/2021).     Zachary Cintron MD   10/5/2021 10:06 AM

## 2021-10-05 NOTE — Clinical Note
Please schedule US next month which will be 6 months from her previous. Thank you!     Ni Hawkins MD  10/5/2021

## 2021-10-26 ENCOUNTER — HOSPITAL ENCOUNTER (OUTPATIENT)
Dept: ULTRASOUND IMAGING | Age: 46
Discharge: HOME | End: 2021-10-28
Payer: COMMERCIAL

## 2021-10-26 DIAGNOSIS — R22.1 NODULE OF NECK: ICD-10-CM

## 2021-10-26 PROCEDURE — 76536 US EXAM OF HEAD AND NECK: CPT

## 2021-11-23 NOTE — PROGRESS NOTES
[facility] Internal Medicine      SUBJECTIVE:  Kerrie Kelly (:  1975) is a 40 y.o. female here for evaluation of the following chief complaint(s):  Follow-up  Anxiety - on 37.5 mg of venlafaxine. Feels that this is helping. Takes at night. Some dizziness prior to taking medication. GI symptoms have improved somewhat. Continue current dose for now. Alternating constipation and diarrhea/Celiac disease - ongoing symptoms with mild improvement. Trish Almazan wonders if her symptoms can be caused by pancreatic insufficiency. She does describe that she has oily stools when she experiences diarrhea. Overactive bowels on physical exam.    Will add PPI to see if this helps with diarrhea. Check fecal fat. If no change in symptoms and fecal fat work-up is negative, Trish Almazan would like to consider a referral to Dr. Maria Ines Ferraro at [facility] for additional opinion. Thyroid nodule - needs repeat scan in one year from previous () TSH normal on check. Breakthrough bleeding - had follow-up with GYN. This bleeding has subsided but has follow-up appointment on 12/10/2021. Follow with GYN. Eczema on palm - Trish Almazan admits to frequent use of hand  which may be causing this eczema. Also with rash on her LE B. Triamcinolone cream to be applied twice daily as needed for the eczema. Add daily loratadine, 10 mg OTC as well to decrease hypersensitivity of skin. Review of Systems as above. Current Outpatient Medications on File Prior to Visit   Medication Sig Dispense Refill    venlafaxine 37.5 MG extended release tablet Take 1 tablet by mouth nightly 30 tablet 1    ondansetron (ZOFRAN) 4 MG tablet Take 1 tablet by mouth 3 times daily as needed for Nausea or Vomiting 15 tablet 0     No current facility-administered medications on file prior to visit.        OBJECTIVE:    VS:   Vitals:    21 1011   BP: 124/89   Pulse: 70 Resp: 18   Temp: 96.7 °F (35.9 °C)   TempSrc: Oral   Weight: 141 lb (64 kg)   Height: 5' 7\" (1.702 m)     Physical Exam   Lungs:  CTA B  Neck:   No carotid bruits appreciated B. Small 0.5 cm nodule on L mid-neck posterior to ear. CVS:  +s1/s2 without m/g/r appreciated. Abd:  + BS, NTND, No renal or aortic bruits, +overactive bowel sounds  Extr:  2+ DP/PT pulses B, no pitting edema, R palm with 1.25 cm area of eczema noted on R mid-palm. +small maculo-papule areas on both LEs B.     RTC:  Return in about 6 weeks (around 1/5/2022).         Cornell Ahuja MD   11/24/2021 11:42 AM

## 2021-11-24 ENCOUNTER — OFFICE VISIT (OUTPATIENT)
Dept: INTERNAL MEDICINE | Age: 46
End: 2021-11-24
Payer: COMMERCIAL

## 2021-11-24 VITALS
BODY MASS INDEX: 22.13 KG/M2 | RESPIRATION RATE: 18 BRPM | HEART RATE: 70 BPM | WEIGHT: 141 LBS | SYSTOLIC BLOOD PRESSURE: 124 MMHG | HEIGHT: 67 IN | DIASTOLIC BLOOD PRESSURE: 89 MMHG | TEMPERATURE: 96.7 F

## 2021-11-24 DIAGNOSIS — K52.9 CHRONIC DIARRHEA: Primary | ICD-10-CM

## 2021-11-24 DIAGNOSIS — L30.8 OTHER ECZEMA: ICD-10-CM

## 2021-11-24 PROCEDURE — 99213 OFFICE O/P EST LOW 20 MIN: CPT | Performed by: INTERNAL MEDICINE

## 2021-11-24 PROCEDURE — 99212 OFFICE O/P EST SF 10 MIN: CPT | Performed by: INTERNAL MEDICINE

## 2021-11-24 RX ORDER — OMEPRAZOLE 40 MG/1
40 CAPSULE, DELAYED RELEASE ORAL
Qty: 50 CAPSULE | Refills: 0 | Status: SHIPPED
Start: 2021-11-24 | End: 2022-06-07

## 2021-11-24 RX ORDER — TRIAMCINOLONE ACETONIDE 0.25 MG/G
CREAM TOPICAL
Qty: 80 G | Refills: 1 | Status: SHIPPED
Start: 2021-11-24 | End: 2022-09-06

## 2021-12-06 RX ORDER — VENLAFAXINE HYDROCHLORIDE 37.5 MG/1
TABLET, EXTENDED RELEASE ORAL
Qty: 30 TABLET | Refills: 0 | Status: SHIPPED
Start: 2021-12-06 | End: 2022-01-05 | Stop reason: SDUPTHER

## 2021-12-07 ENCOUNTER — OFFICE VISIT (OUTPATIENT)
Dept: INTERNAL MEDICINE | Age: 46
End: 2021-12-07
Payer: COMMERCIAL

## 2021-12-07 VITALS
OXYGEN SATURATION: 97 % | HEIGHT: 67 IN | DIASTOLIC BLOOD PRESSURE: 74 MMHG | SYSTOLIC BLOOD PRESSURE: 121 MMHG | HEART RATE: 93 BPM | TEMPERATURE: 97.5 F | BODY MASS INDEX: 21.97 KG/M2 | RESPIRATION RATE: 18 BRPM | WEIGHT: 140 LBS

## 2021-12-07 DIAGNOSIS — R59.9 SWOLLEN LYMPH NODES: Primary | ICD-10-CM

## 2021-12-07 PROCEDURE — 99213 OFFICE O/P EST LOW 20 MIN: CPT | Performed by: INTERNAL MEDICINE

## 2021-12-07 PROCEDURE — 99212 OFFICE O/P EST SF 10 MIN: CPT | Performed by: INTERNAL MEDICINE

## 2021-12-07 RX ORDER — METHYLPREDNISOLONE 4 MG/1
TABLET ORAL
Qty: 1 KIT | Refills: 0 | Status: SHIPPED | OUTPATIENT
Start: 2021-12-07 | End: 2021-12-13

## 2021-12-07 RX ORDER — AMOXICILLIN AND CLAVULANATE POTASSIUM 875; 125 MG/1; MG/1
1 TABLET, FILM COATED ORAL 2 TIMES DAILY
Qty: 14 TABLET | Refills: 0 | Status: SHIPPED | OUTPATIENT
Start: 2021-12-07 | End: 2021-12-14

## 2021-12-07 NOTE — PROGRESS NOTES
[facility] Internal Medicine      SUBJECTIVE:  Valerie Gordon (:  1975) is a 40 y.o. female here for evaluation of the following chief complaint(s): Other (swollen glands)  Has experienced swollen lymph nodes since childhood. These have been widespread in inguinal area, neck and occiput. Current episode started 2021. No fevers or chills. Having diarrhea as well. No nausea or vomiting. No double vision or blurry vision. No new exposures. No other evidence of infection. Uncertain etiology - will treat with Augmentin and medrol dose pack as this may be a brewing sinus infection given tenderness over ethmoid sinuses. Review of Systems as above. Current Outpatient Medications on File Prior to Visit   Medication Sig Dispense Refill    venlafaxine 37.5 MG extended release tablet TAKE ONE TABLET BY MOUTH EVERY NIGHT 30 tablet 0    omeprazole (PRILOSEC) 40 MG delayed release capsule Take 1 capsule by mouth every morning (before breakfast) 50 capsule 0    triamcinolone (KENALOG) 0.025 % cream Apply topically 2 times daily. 80 g 1    ondansetron (ZOFRAN) 4 MG tablet Take 1 tablet by mouth 3 times daily as needed for Nausea or Vomiting 15 tablet 0     No current facility-administered medications on file prior to visit. OBJECTIVE:    VS:   Vitals:    21 0851   BP: 121/74   Site: Left Upper Arm   Position: Sitting   Cuff Size: Medium Adult   Pulse: 93   Resp: 18   Temp: 97.5 °F (36.4 °C)   TempSrc: Temporal   SpO2: 97%   Weight: 140 lb (63.5 kg)   Height: 5' 7\" (1.702 m)     Physical Exam   HEENT:  +Swelling with pain in R occiput, mild erythema posterior to L pinna. R TM with some fluid behind. Normal ear canals B. + tenderness over ethmoid sinuses. No maxillary or frontal sinus pain on percussion. Lungs:  CTA B  Neck:   No carotid bruits appreciated B.   CVS:  +s1/s2 without m/g/r appreciated.     Abd:  + BS, NTND, No renal or aortic bruits   Extr:  2+ DP/PT pulses B, no pitting edema    RTC:  As previously scheduled.        Ni Hawkins MD   12/7/2021 10:03 AM

## 2021-12-27 ENCOUNTER — HOSPITAL ENCOUNTER (OUTPATIENT)
Dept: GENERAL RADIOLOGY | Age: 46
Discharge: HOME | End: 2021-12-29
Payer: COMMERCIAL

## 2021-12-27 DIAGNOSIS — Z80.3 FAMILY HISTORY OF BREAST CANCER: ICD-10-CM

## 2021-12-27 DIAGNOSIS — R92.2 DENSE BREAST TISSUE: ICD-10-CM

## 2021-12-27 DIAGNOSIS — Z12.31 VISIT FOR SCREENING MAMMOGRAM: ICD-10-CM

## 2021-12-27 PROCEDURE — 76641 ULTRASOUND BREAST COMPLETE: CPT

## 2021-12-27 PROCEDURE — 77063 BREAST TOMOSYNTHESIS BI: CPT

## 2022-01-05 ENCOUNTER — OFFICE VISIT (OUTPATIENT)
Dept: INTERNAL MEDICINE | Age: 47
End: 2022-01-05
Payer: COMMERCIAL

## 2022-01-05 VITALS
OXYGEN SATURATION: 98 % | BODY MASS INDEX: 22.91 KG/M2 | HEIGHT: 67 IN | SYSTOLIC BLOOD PRESSURE: 116 MMHG | WEIGHT: 146 LBS | HEART RATE: 86 BPM | TEMPERATURE: 97.2 F | DIASTOLIC BLOOD PRESSURE: 89 MMHG | RESPIRATION RATE: 20 BRPM

## 2022-01-05 DIAGNOSIS — F41.9 ANXIETY: Primary | ICD-10-CM

## 2022-01-05 DIAGNOSIS — E04.1 THYROID NODULE: ICD-10-CM

## 2022-01-05 PROCEDURE — 99212 OFFICE O/P EST SF 10 MIN: CPT | Performed by: INTERNAL MEDICINE

## 2022-01-05 PROCEDURE — 99213 OFFICE O/P EST LOW 20 MIN: CPT | Performed by: INTERNAL MEDICINE

## 2022-01-05 RX ORDER — VENLAFAXINE HYDROCHLORIDE 37.5 MG/1
TABLET, EXTENDED RELEASE ORAL
Qty: 30 TABLET | Refills: 0 | Status: SHIPPED
Start: 2022-01-05 | End: 2022-02-10 | Stop reason: SDUPTHER

## 2022-01-05 NOTE — PROGRESS NOTES
Patient has not knowingly had unprotected exposire to anyone positive for COVID-10 within the last 14 days DENIES    AND does not have the following signs or symptoms:    A. One of the followin. Fever greater than 100.0 F NEGATIVE       2. Cough NEGATIVE       3. New onset shortness of breath NEGATIVE       4. New onset difficulty breathing NEGATIVE    AND/OR    B. Two or more of the following criteria:        1. Muscle aches NEGATIVE       2. Headache NEGATIVE       3. Sore Throat NEGATIVE       4. New onset loss of smell or taste NEGATIVE       5. New onset diarrhea NEGATIVE       6. Chills NEGATIVE       7. Runny nose NEGATIVE       8. Sneezing NEGATIVE    *had COVID on 21*      Patient given instruction by Dr Carmelina Crawford. 3 month follow up scheduled. Printed AVS given to patient.

## 2022-02-02 DIAGNOSIS — R59.9 SWOLLEN LYMPH NODES: Primary | ICD-10-CM

## 2022-02-02 NOTE — PROGRESS NOTES
Patient researched allergists and has requested a referral to Dr. Elio Devine. I will place this referral and then call to update him on the clinical history to see if allergies may be contributing to ongoing symptoms.      Danielle Loja MD  2/2/2022

## 2022-02-10 DIAGNOSIS — F41.9 ANXIETY: ICD-10-CM

## 2022-02-10 RX ORDER — VENLAFAXINE HYDROCHLORIDE 37.5 MG/1
TABLET, EXTENDED RELEASE ORAL
Qty: 90 TABLET | Refills: 1 | Status: SHIPPED
Start: 2022-02-10 | End: 2022-03-21 | Stop reason: SDUPTHER

## 2022-03-21 DIAGNOSIS — F41.9 ANXIETY: ICD-10-CM

## 2022-03-21 RX ORDER — VENLAFAXINE HYDROCHLORIDE 37.5 MG/1
TABLET, EXTENDED RELEASE ORAL
Qty: 90 TABLET | Refills: 1 | Status: SHIPPED
Start: 2022-03-21 | End: 2022-06-07

## 2022-03-21 NOTE — PROGRESS NOTES
Patient stated that her insurance will not cover venlafaxine unless this is processed through [facility]. I sent over a new prescription to this pharmacy. Patient aware.     Tommy Nunez MD  3/21/2022

## 2022-03-22 ENCOUNTER — TELEPHONE (OUTPATIENT)
Dept: INTERNAL MEDICINE | Age: 47
End: 2022-03-22

## 2022-03-30 ENCOUNTER — HOSPITAL ENCOUNTER (OUTPATIENT)
Age: 47
Discharge: HOME | End: 2022-03-30
Payer: COMMERCIAL

## 2022-03-30 LAB
C-REACTIVE PROTEIN: 0.3 MG/DL (ref 0–0.4)
HBA1C MFR BLD: 4.9 % (ref 4–5.6)
HCT VFR BLD CALC: 42.1 % (ref 34–48)
HEMOGLOBIN: 13.9 G/DL (ref 11.5–15.5)
MCH RBC QN AUTO: 29 PG (ref 26–35)
MCHC RBC AUTO-ENTMCNC: 33 % (ref 32–34.5)
MCV RBC AUTO: 87.7 FL (ref 80–99.9)
PDW BLD-RTO: 12.7 FL (ref 11.5–15)
PLATELET # BLD: 269 E9/L (ref 130–450)
PMV BLD AUTO: 10.5 FL (ref 7–12)
RBC # BLD: 4.8 E12/L (ref 3.5–5.5)
SEDIMENTATION RATE, ERYTHROCYTE: 0 MM/HR (ref 0–20)
WBC # BLD: 10.1 E9/L (ref 4.5–11.5)

## 2022-03-30 PROCEDURE — 86140 C-REACTIVE PROTEIN: CPT

## 2022-03-30 PROCEDURE — 86317 IMMUNOASSAY INFECTIOUS AGENT: CPT

## 2022-03-30 PROCEDURE — 86038 ANTINUCLEAR ANTIBODIES: CPT

## 2022-03-30 PROCEDURE — 85651 RBC SED RATE NONAUTOMATED: CPT

## 2022-03-30 PROCEDURE — 82784 ASSAY IGA/IGD/IGG/IGM EACH: CPT

## 2022-03-30 PROCEDURE — 83036 HEMOGLOBIN GLYCOSYLATED A1C: CPT

## 2022-03-30 PROCEDURE — 85027 COMPLETE CBC AUTOMATED: CPT

## 2022-03-30 PROCEDURE — 36415 COLL VENOUS BLD VENIPUNCTURE: CPT

## 2022-03-30 PROCEDURE — 82785 ASSAY OF IGE: CPT

## 2022-03-31 LAB
ANTI-NUCLEAR ANTIBODY (ANA): NEGATIVE
IGA: 103 MG/DL (ref 70–400)
IGG: 1093 MG/DL (ref 700–1600)
IGM: 141 MG/DL (ref 40–230)

## 2022-04-02 LAB — IGE: 20 KU/L

## 2022-04-03 LAB
Lab: NORMAL
REPORT: NORMAL
THIS TEST SENT TO: NORMAL

## 2022-04-05 NOTE — PROGRESS NOTES
[facility] Internal Medicine      SUBJECTIVE:  Emma Branch (:  1975) is a [address] y.o. female here for evaluation of the following chief complaint(s):  Follow-up (states follow up for allergies)  Lymphadenopathy - was referred to Dr. Mildred Nagy for allergy evaluation. No allergies to food were tested. Environmental allergy panel was positive except for dogs. On Claritin and Allegra daily. Due back in 4 months. Just started Allegra yesterday. Return to allergy for drug allergy testing. Continue Claritin and Allegra    Will review allergy notes     Dr. Wilson Kitchen - ophthalmology in [place]. Will need to obtain old records. Anxiety  - On venlafaxine, 37.5 mg daily. Capsules are getting stuck. Jessica Simpson is worried that venlafaxine may be causing increase in weight. Usually this causes weight loss but will need to continue to monitor. Call placed to pharmacy to have preauthorization form sent to this office to obtain capsules. Constipation/Diarrhea - ? Allergy. Still with diarrhea in the morning. Ongoing symptoms with question of whether these are related to allergic symptoms as above. Potential for immune etiology as well. Will discuss with hematology to see if further work-up for an immune cause would be recommended. Thyroid nodule - needs US in . Review of Systems    Current Outpatient Medications on File Prior to Visit   Medication Sig Dispense Refill    venlafaxine 37.5 MG extended release tablet TAKE ONE TABLET BY MOUTH EVERY NIGHT (Patient taking differently: TAKE ONE CAPSULE BY MOUTH EVERY NIGHT  Per pharmacy, tablets are not covered by capsule is covered.) 90 tablet 1    triamcinolone (KENALOG) 0.025 % cream Apply topically 2 times daily.  80 g 1    ondansetron (ZOFRAN) 4 MG tablet Take 1 tablet by mouth 3 times daily as needed for Nausea or Vomiting 15 tablet 0    omeprazole (PRILOSEC) 40 MG delayed release capsule Take 1 capsule by mouth every morning (before breakfast) (Patient not taking: Reported on 4/6/2022) 50 capsule 0     No current facility-administered medications on file prior to visit. OBJECTIVE:    VS:   Vitals:    04/06/22 0936   BP: 115/81   Site: Right Upper Arm   Position: Sitting   Cuff Size: Medium Adult   Pulse: 72   Resp: 18   Temp: 97.2 °F (36.2 °C)   TempSrc: Temporal   SpO2: 99%   Weight: 155 lb (70.3 kg)   Height: 5' 7\" (1.702 m)     Physical Exam   Lungs:  CTA B  Neck:   No carotid bruits appreciated B.   CVS:  +s1/s2 without m/g/r appreciated. Abd:  + BS, NTND, No renal or aortic bruits   Extr:  2+ DP/PT pulses B, no pitting edema    RTC:  Return in about 3 months (around 7/6/2022).         Lindsay Woodall MD   4/6/2022 10:16 AM

## 2022-04-06 ENCOUNTER — OFFICE VISIT (OUTPATIENT)
Dept: INTERNAL MEDICINE | Age: 47
End: 2022-04-06
Payer: COMMERCIAL

## 2022-04-06 VITALS
HEIGHT: 67 IN | WEIGHT: 155 LBS | RESPIRATION RATE: 18 BRPM | SYSTOLIC BLOOD PRESSURE: 115 MMHG | DIASTOLIC BLOOD PRESSURE: 81 MMHG | TEMPERATURE: 97.2 F | BODY MASS INDEX: 24.33 KG/M2 | HEART RATE: 72 BPM | OXYGEN SATURATION: 99 %

## 2022-04-06 DIAGNOSIS — K52.9 CHRONIC DIARRHEA: Primary | ICD-10-CM

## 2022-04-06 DIAGNOSIS — Z91.09 MULTIPLE ENVIRONMENTAL ALLERGIES: ICD-10-CM

## 2022-04-06 DIAGNOSIS — F41.9 ANXIETY: ICD-10-CM

## 2022-04-06 PROCEDURE — 99213 OFFICE O/P EST LOW 20 MIN: CPT | Performed by: INTERNAL MEDICINE

## 2022-04-06 SDOH — ECONOMIC STABILITY: FOOD INSECURITY: WITHIN THE PAST 12 MONTHS, THE FOOD YOU BOUGHT JUST DIDN'T LAST AND YOU DIDN'T HAVE MONEY TO GET MORE.: NEVER TRUE

## 2022-04-06 SDOH — ECONOMIC STABILITY: TRANSPORTATION INSECURITY
IN THE PAST 12 MONTHS, HAS THE LACK OF TRANSPORTATION KEPT YOU FROM MEDICAL APPOINTMENTS OR FROM GETTING MEDICATIONS?: NO

## 2022-04-06 SDOH — ECONOMIC STABILITY: HOUSING INSECURITY
IN THE LAST 12 MONTHS, WAS THERE A TIME WHEN YOU DID NOT HAVE A STEADY PLACE TO SLEEP OR SLEPT IN A SHELTER (INCLUDING NOW)?: NO

## 2022-04-06 SDOH — ECONOMIC STABILITY: INCOME INSECURITY: IN THE LAST 12 MONTHS, WAS THERE A TIME WHEN YOU WERE NOT ABLE TO PAY THE MORTGAGE OR RENT ON TIME?: NO

## 2022-04-06 SDOH — ECONOMIC STABILITY: FOOD INSECURITY: WITHIN THE PAST 12 MONTHS, YOU WORRIED THAT YOUR FOOD WOULD RUN OUT BEFORE YOU GOT MONEY TO BUY MORE.: NEVER TRUE

## 2022-04-06 SDOH — ECONOMIC STABILITY: HOUSING INSECURITY: IN THE LAST 12 MONTHS, HOW MANY PLACES HAVE YOU LIVED?: 1

## 2022-04-06 ASSESSMENT — PATIENT HEALTH QUESTIONNAIRE - PHQ9
2. FEELING DOWN, DEPRESSED OR HOPELESS: 0
SUM OF ALL RESPONSES TO PHQ9 QUESTIONS 1 & 2: 0
SUM OF ALL RESPONSES TO PHQ QUESTIONS 1-9: 0
1. LITTLE INTEREST OR PLEASURE IN DOING THINGS: 0

## 2022-04-06 ASSESSMENT — SOCIAL DETERMINANTS OF HEALTH (SDOH): HOW HARD IS IT FOR YOU TO PAY FOR THE VERY BASICS LIKE FOOD, HOUSING, MEDICAL CARE, AND HEATING?: NOT HARD AT ALL

## 2022-04-06 ASSESSMENT — LIFESTYLE VARIABLES: HOW OFTEN DO YOU HAVE A DRINK CONTAINING ALCOHOL: NEVER

## 2022-04-06 NOTE — PROGRESS NOTES
AVS printed with follow up appointment and discharge instructions and given to patient. IVONE obtained for Dr. Geoff Cole and faxed to obtain records. Instructed to call with any issues. Patient aware prior auth to be obtained as she can only take tablets with Effexor not capsules as they make her choke.

## 2022-06-07 ENCOUNTER — OFFICE VISIT (OUTPATIENT)
Dept: PODIATRY | Age: 47
End: 2022-06-07
Payer: COMMERCIAL

## 2022-06-07 VITALS — WEIGHT: 155 LBS | HEIGHT: 67 IN | BODY MASS INDEX: 24.33 KG/M2

## 2022-06-07 DIAGNOSIS — M79.671 PAIN OF RIGHT HEEL: Primary | ICD-10-CM

## 2022-06-07 DIAGNOSIS — M72.2 PLANTAR FASCIAL FIBROMATOSIS: ICD-10-CM

## 2022-06-07 DIAGNOSIS — M20.5X1 HALLUX LIMITUS OF RIGHT FOOT: ICD-10-CM

## 2022-06-07 PROCEDURE — 99203 OFFICE O/P NEW LOW 30 MIN: CPT | Performed by: PODIATRIST

## 2022-06-07 RX ORDER — MELOXICAM 15 MG/1
15 TABLET ORAL DAILY
Qty: 30 TABLET | Refills: 1 | Status: SHIPPED
Start: 2022-06-07 | End: 2022-09-06

## 2022-06-07 NOTE — PATIENT INSTRUCTIONS
Patient Education        Plantar Fasciitis: Care Instructions  Overview     Plantar fasciitis is pain and inflammation of the plantar fascia, the tissue at the bottom of your foot that connects the heel bone to the toes. The plantar fascia also supports the arch. If you strain the plantar fascia, it can developsmall tears and cause heel pain when you stand or walk. Plantar fasciitis can be caused by running or other sports. It also may occur in people who are overweight or who have high arches or flat feet. You may get plantar fasciitis if you walk or stand for long periods, or have a tightAchilles tendon or calf muscles. You can improve your foot pain with rest and other care at home. It might takea few weeks to a few months for your foot to heal completely. Follow-up care is a key part of your treatment and safety. Be sure to make and go to all appointments, and call your doctor if you are having problems. It's also a good idea to know your test results and keep alist of the medicines you take. How can you care for yourself at home?  Rest your feet often. Reduce your activity to a level that lets you avoid pain. If possible, do not run or walk on hard surfaces.  Take pain medicines exactly as directed. ? If the doctor gave you a prescription medicine for pain, take it as prescribed. ? If you are not taking a prescription pain medicine, take an over-the-counter anti-inflammatory medicine for pain and swelling, such as ibuprofen (Advil, Motrin) or naproxen (Aleve). Read and follow all instructions on the label.  Use ice massage to help with pain and swelling. You can use an ice cube or an ice cup several times a day. To make an ice cup, fill a paper cup with water and freeze it. Cut off the top of the cup until a half-inch of ice shows. Hold onto the remaining paper to use the cup. Rub the ice in small circles over the area for 5 to 7 minutes.    Contrast baths, which alternate hot and cold water, can also help reduce swelling. But because heat alone may make pain and swelling worse, end a contrast bath with a soak in cold water.  Wear a night splint if your doctor suggests it. A night splint holds your foot with the toes pointed up and the foot and ankle at a 90-degree angle. This position gives the bottom of your foot a constant, gentle stretch.  Do simple exercises such as calf stretches and towel stretches 2 to 3 times each day, especially when you first get up in the morning. These can help the plantar fascia become more flexible. They also make the muscles that support your arch stronger. Hold these stretches for 15 to 30 seconds per stretch. Repeat 2 to 4 times. ? Stand about 1 foot from a wall. Place the palms of both hands against the wall at chest level. Lean forward against the wall, keeping one leg with the knee straight and heel on the ground while bending the knee of the other leg.  ? Sit down on the floor or a mat with your feet stretched in front of you. Roll up a towel lengthwise, and loop it over the ball of your foot. Holding the towel at both ends, gently pull the towel toward you to stretch your foot.  Wear shoes with good arch support. Athletic shoes or shoes with a well-cushioned sole are good choices.  Replace athletic shoes regularly.  Try heel cups or shoe inserts (orthotics) to help cushion your heel. You can buy these at many shoe stores.  Put on your shoes as soon as you get out of bed. Going barefoot or wearing slippers may make your pain worse.  Reach and stay at a good weight for your height. This puts less strain on your feet. When should you call for help? Call your doctor now or seek immediate medical care if:     You have heel pain with fever, redness, or warmth in your heel.      You cannot put weight on the sore foot.    Watch closely for changes in your health, and be sure to contact your doctor if:     You have numbness or tingling in your heel.      Your heel pain lasts more than 2 weeks. Where can you learn more? Go to [email or web address] org and sign in to your [employer] account. Enter R210 in the [employer] box to learn more about \"Plantar Fasciitis: Care Instructions. \"     If you do not have an account, please click on the \"Sign Up Now\" link. Current as of: July 1, 2021               Content Version: 13.2  © 2006-2022 Healthwise, Incorporated. Care instructions adapted under license by [facility] ([facility]). If you have questions about a medical condition or this instruction, always ask your healthcare professional. [address] any warranty or liability for your use of this information.

## 2022-06-07 NOTE — PROGRESS NOTES
New patient in office with c/o right heel pain. Sx x 1.5 weeks. Denies any injury but does walk on treadmill frequently. Xrays obtained prior to apt. Cristina Allen MD last seen 2022. Iesha Lazo : 1975 Sex: female  Age: 40 y.o. Patient was referred by Cristina Allen MD    CC:    Right heel pain    HPI:   This pleasant 52-year-old female patient referred me today right heel pain over the past week. No recent injury or trauma. Radiographs obtained today. No current use of anti-inflammatories. Tenderness worse in the morning with first few steps and at the end the day. No current custom orthotics. No current calf pain. No additional pedal complaints at this time. ROS:  Const: Denies constitutional symptoms  Musculo: Denies symptoms other than stated above  Skin: Denies symptoms other than stated above       Current Outpatient Medications:     meloxicam (MOBIC) 15 MG tablet, Take 1 tablet by mouth daily for 30 doses, Disp: 30 tablet, Rfl: 1    triamcinolone (KENALOG) 0.025 % cream, Apply topically 2 times daily. , Disp: 80 g, Rfl: 1    ondansetron (ZOFRAN) 4 MG tablet, Take 1 tablet by mouth 3 times daily as needed for Nausea or Vomiting, Disp: 15 tablet, Rfl: 0  Allergies   Allergen Reactions    Methylprednisolone Other (See Comments)     Passed out. Police called for welfare check    Codeine Nausea And Vomiting       Past Medical History:   Diagnosis Date    Celiac disease     Colitis     lymphcytic    Hypoparathyroidism ([facility] [address]     Iron deficiency anemia     Dr. Rodriguez Rim infusions years ago    Prolonged emergence from general anesthesia     Vitiligo            Vitals:    22 1532   Weight: 155 lb (70.3 kg)   Height: 5' 7\" (1.702 m)       Work History/Social History:    Foot and ankle history:     Focused Lower Extremity Physical Exam:    Neurovascular examination:    Dorsalis Pedis palpable bilateral.  Posterior tibialis palpable bilateral.    Capillary Refill Time:  Immediate return  Hair growth:  Symmetrical and bilateral   Skin:  Not atrophic  Edema: No edema bilateral feet or ankles. Neurologic:  Light touch intact bilateral.      Musculoskeletal/ Orthopedic examination:    Equinis: Absent bilateral  Dorsiflexion, plantarflexion, inversion, eversion bilateral 5 out of 5 muscle strength  Wiggling toes  Negative Homans  Decreased dorsiflexion plantarflexion right first metatarsophalangeal joint. Mild tenderness dorsal first metatarsal head right foot. Tenderness palpation medial band right plantar fascial.  Negative calcaneal squeeze test.  No pain insertion or mid substance Achilles tendon. Negative Salas. Dermatology examination:    No open skin lesions or abrasions bilateral lower extremity. Assessment and Plan:  Gabe Pedraza was seen today for foot pain. Diagnoses and all orders for this visit:    Pain of right heel  -     XR FOOT RIGHT (MIN 3 VIEWS); Future    Plantar fascial fibromatosis    Hallux limitus of right foot    Other orders  -     meloxicam (MOBIC) 15 MG tablet; Take 1 tablet by mouth daily for 30 doses    Presents today new referral hallux limitus right great toe and plantar fasciitis right heel    Plantar fasciitis is a condition involving inflammation and pain to the heel. I did recommend passive and active range of motion stretches and strengthening of both the Achilles tendon and plantar fascia  Ice 10 minutes each night on the bottom of the plantar fascia  Avoid barefoot  Continue supportive walking shoe with well supported insert  I did recommend Powerstep Original full-length over-the-counter orthotics. Mobic 15 mg take once daily as directed. Risk and benefits of anti-inflammatories discussed in detail. Radiographs right foot reviewed  No acute osseous abnormality.       Plantar soft tissue edema adjacent to the calcaneus.       Degenerative changes MTP joint great toe. I will follow-up in office 1 month.   Would consider cortisone injection plantar fascial right if continued symptoms. Return in about 1 month (around 7/7/2022). Seen By:  Dona Baca DPM      Document was created using voice recognition software. Note was reviewed, however may contain grammatical errors.

## 2022-07-05 ENCOUNTER — OFFICE VISIT (OUTPATIENT)
Dept: PODIATRY | Age: 47
End: 2022-07-05
Payer: COMMERCIAL

## 2022-07-05 VITALS — HEIGHT: 67 IN | WEIGHT: 155 LBS | BODY MASS INDEX: 24.33 KG/M2

## 2022-07-05 DIAGNOSIS — M20.5X1 HALLUX LIMITUS OF RIGHT FOOT: ICD-10-CM

## 2022-07-05 DIAGNOSIS — M72.2 PLANTAR FASCIAL FIBROMATOSIS: ICD-10-CM

## 2022-07-05 DIAGNOSIS — M79.671 PAIN OF RIGHT HEEL: Primary | ICD-10-CM

## 2022-07-05 PROCEDURE — 99213 OFFICE O/P EST LOW 20 MIN: CPT | Performed by: PODIATRIST

## 2022-07-05 RX ORDER — BETAMETHASONE SODIUM PHOSPHATE AND BETAMETHASONE ACETATE 3; 3 MG/ML; MG/ML
6 INJECTION, SUSPENSION INTRA-ARTICULAR; INTRALESIONAL; INTRAMUSCULAR; SOFT TISSUE ONCE
Status: CANCELLED | OUTPATIENT
Start: 2022-07-05 | End: 2022-07-05

## 2022-07-05 RX ORDER — LIDOCAINE HYDROCHLORIDE 10 MG/ML
1 INJECTION, SOLUTION INFILTRATION; PERINEURAL ONCE
Status: CANCELLED | OUTPATIENT
Start: 2022-07-05 | End: 2022-07-05

## 2022-07-05 RX ORDER — DEXAMETHASONE SODIUM PHOSPHATE 4 MG/ML
4 INJECTION, SOLUTION INTRA-ARTICULAR; INTRALESIONAL; INTRAMUSCULAR; INTRAVENOUS; SOFT TISSUE ONCE
Status: CANCELLED | OUTPATIENT
Start: 2022-07-05 | End: 2022-07-05

## 2022-07-05 NOTE — PROGRESS NOTES
Patient in office to follow up with right foot pain. Continues to have pain. CC:    Follow-up right heel pain      HPI:   Presents today follow-up right heel pain. Was tolerating oral Mobic well but has since been unable to tolerate the oral Mobic due to stomach issues. Denies any recent injury. Still having some tenderness on the bottom of the right heel. No significant pain on top of the right great toe today but does have occasional tenderness. Next  ROS:  Const: Denies constitutional symptoms  Musculo: Denies symptoms other than stated above  Skin: Denies symptoms other than stated above       Current Outpatient Medications:     meloxicam (MOBIC) 15 MG tablet, Take 1 tablet by mouth daily for 30 doses, Disp: 30 tablet, Rfl: 1    triamcinolone (KENALOG) 0.025 % cream, Apply topically 2 times daily. , Disp: 80 g, Rfl: 1    ondansetron (ZOFRAN) 4 MG tablet, Take 1 tablet by mouth 3 times daily as needed for Nausea or Vomiting, Disp: 15 tablet, Rfl: 0  Allergies   Allergen Reactions    Methylprednisolone Other (See Comments)     Passed out. Police called for welfare check    Prednisone Other (See Comments)     Passed out    Codeine Nausea And Vomiting       Past Medical History:   Diagnosis Date    Celiac disease     Colitis     lymphcytic    [facility]opar[facility] ([facility] [address]     Iron deficiency anemia     Dr. Elizabeth Torres infusions years ago    Prolonged emergence from general anesthesia     Vitiligo            Vitals:    07/05/22 1550   Weight: 155 lb (70.3 kg)   Height: 5' 7\" (1.702 m)       Work History/Social History: Foot and ankle history:     Focused Lower Extremity Physical Exam:    Neurovascular examination:    Dorsalis Pedis palpable bilateral.  Posterior tibialis palpable bilateral.    Capillary Refill Time:  Immediate return  Hair growth:  Symmetrical and bilateral   Skin:  Not atrophic  Edema: No edema bilateral feet or ankles.   Neurologic:  Light touch intact bilateral. Musculoskeletal/ Orthopedic examination:    Equinis: Absent bilateral  Dorsiflexion, plantarflexion, inversion, eversion bilateral 5 out of 5 muscle strength  Wiggling toes  Negative Homans  Decreased dorsiflexion plantarflexion right first metatarsophalangeal joint. No significant tenderness overlying first metatarsophalangeal joint bilateral.  Mild tenderness palpation medial band plantar fascia right. Dermatology examination:    No open skin lesions or abrasions bilateral lower extremity. Assessment and Plan:  Antonette Holter was seen today for follow-up and foot pain. Diagnoses and all orders for this visit:    Pain of right heel    Plantar fascial fibromatosis    Hallux limitus of right foot    Follow-up hallux limitus and plantar fasciitis right heel  We still did discuss power step orthotics. Avoid barefoot. We did discuss cortisone injection plantar fascial right heel but she does have an allergy in the past to cortisone. I would not recommend cortisone injection at this time. Stressed importance of avoiding barefoot. She is going to [facility] I will see her when she returns. Return in about 6 weeks (around 8/16/2022). Seen By:  Orestes Boudreaux DPM      Document was created using voice recognition software. Note was reviewed, however may contain grammatical errors.

## 2022-08-17 NOTE — PROGRESS NOTES
[facility] Internal Medicine      SUBJECTIVE:  Gemma Esparza (:  1975) is a 56 y.o. female here for evaluation of the following chief complaint(s): Anxiety  Multiple allergies along with alternating constipation/diarrhea -  - put on loratadine syrup and will be having dexamethasone challenge test in the office as well. Taking loratadine but will stop prior to this next test.  Went on vacation to [facility], she had no symptoms of GI.  WIthin two weeks of coming back home, symptoms returned. Given improvement on vacation, will look to investigate if food additive/preservatives/pesticides may be interacting to lead to any of the symptoms Hubert Graves experiences. Anxiety - stopped taking venlafaxine. Finished taking it months ago. Feels better and is working out. Monitor for recurrence/worsening    Plantar fasciitis  -Hubert Graves is seeing podiatry for this issue. She is awaiting steroid injections after a dexamethasone challenge from allergy. Continue symptomatic management    I have also recommended a [place] sock for relief of pain. Thyroid nodule -asymptomatic   This is to be completed in October and will be scheduled. Review of Systems as above    Current Outpatient Medications on File Prior to Visit   Medication Sig Dispense Refill    triamcinolone (KENALOG) 0.1 % cream APPLY TO AFFECTED AREA 2 TIMES DAILY AS NEEDED FOR IRRITATION      meloxicam (MOBIC) 15 MG tablet Take 1 tablet by mouth daily for 30 doses 30 tablet 1    ondansetron (ZOFRAN) 4 MG tablet Take 1 tablet by mouth 3 times daily as needed for Nausea or Vomiting 15 tablet 0    triamcinolone (KENALOG) 0.025 % cream Apply topically 2 times daily. (Patient not taking: Reported on 2022) 80 g 1     No current facility-administered medications on file prior to visit.        OBJECTIVE:    VS:   Vitals:    22 0829   BP: 120/85   Site: Left Upper Arm   Position: Sitting   Cuff Size: Medium Adult   Pulse: 63   Resp: 18   Temp: 97 °F (36.1 °C)   TempSrc: Temporal   SpO2: 99%   Weight: 142 lb 4.8 oz (64.5 kg)   Height: 5' 7\" (1.702 m)     Physical Exam   Lungs:  CTA B  Neck:   No carotid bruits appreciated B. No lymphadenopathy appreciated  CVS:  +s1/s2 without m/g/r appreciated. Abd:  + BS, NTND, No renal or aortic bruits   Extr:  2+ DP/PT pulses B, no pitting edema        RTC:  Return in about 3 months (around 11/18/2022).       Karina Jolley MD   8/19/2022 12:03 PM

## 2022-08-18 ENCOUNTER — OFFICE VISIT (OUTPATIENT)
Dept: INTERNAL MEDICINE | Age: 47
End: 2022-08-18
Payer: COMMERCIAL

## 2022-08-18 VITALS
OXYGEN SATURATION: 99 % | BODY MASS INDEX: 22.34 KG/M2 | HEART RATE: 63 BPM | WEIGHT: 142.3 LBS | DIASTOLIC BLOOD PRESSURE: 85 MMHG | SYSTOLIC BLOOD PRESSURE: 120 MMHG | TEMPERATURE: 97 F | RESPIRATION RATE: 18 BRPM | HEIGHT: 67 IN

## 2022-08-18 DIAGNOSIS — K52.9 CHRONIC DIARRHEA: ICD-10-CM

## 2022-08-18 DIAGNOSIS — E04.1 THYROID NODULE: Primary | ICD-10-CM

## 2022-08-18 DIAGNOSIS — Z11.4 ENCOUNTER FOR SCREENING FOR HIV: ICD-10-CM

## 2022-08-18 DIAGNOSIS — Z11.59 NEED FOR HEPATITIS C SCREENING TEST: ICD-10-CM

## 2022-08-18 DIAGNOSIS — Z91.09 MULTIPLE ENVIRONMENTAL ALLERGIES: ICD-10-CM

## 2022-08-18 PROCEDURE — 99213 OFFICE O/P EST LOW 20 MIN: CPT | Performed by: INTERNAL MEDICINE

## 2022-08-18 PROCEDURE — 99212 OFFICE O/P EST SF 10 MIN: CPT | Performed by: INTERNAL MEDICINE

## 2022-08-18 RX ORDER — TRIAMCINOLONE ACETONIDE 1 MG/G
CREAM TOPICAL
COMMUNITY
Start: 2022-08-08

## 2022-09-06 ENCOUNTER — OFFICE VISIT (OUTPATIENT)
Dept: PODIATRY | Age: 47
End: 2022-09-06
Payer: COMMERCIAL

## 2022-09-06 DIAGNOSIS — M79.671 PAIN OF RIGHT HEEL: Primary | ICD-10-CM

## 2022-09-06 DIAGNOSIS — M72.2 PLANTAR FASCIAL FIBROMATOSIS: ICD-10-CM

## 2022-09-06 PROCEDURE — 99213 OFFICE O/P EST LOW 20 MIN: CPT | Performed by: PODIATRIST

## 2022-09-06 NOTE — PROGRESS NOTES
Patient in office to follow up with right heel pain. Continues to have pain. CC:    Follow-up plantar fasciitis right      HPI:   Follow-up plantar fasciitis right. Did have recent allergy testing and does have allergy to methylprednisolone and prednisone. Still having symptoms with tenderness on the bottom of the right heel. States she still has been running and has been having most of the tenderness at the end of the day on the bottom and outside of the right heel. No calf pain no Achilles pain. Denies any significant pain at the right great toe joint. ROS:  Const: Denies constitutional symptoms  Musculo: Denies symptoms other than stated above  Skin: Denies symptoms other than stated above       Current Outpatient Medications:     triamcinolone (KENALOG) 0.1 % cream, APPLY TO AFFECTED AREA 2 TIMES DAILY AS NEEDED FOR IRRITATION, Disp: , Rfl:     ondansetron (ZOFRAN) 4 MG tablet, Take 1 tablet by mouth 3 times daily as needed for Nausea or Vomiting, Disp: 15 tablet, Rfl: 0  Allergies   Allergen Reactions    Methylprednisolone Other (See Comments)     Passed out. Police called for welfare check    Prednisone Other (See Comments)     9/2/22 Avoid prednisone per Dr. Julita Posadas    Codeine Nausea And Vomiting       Past Medical History:   Diagnosis Date    Celiac disease     Colitis     lymphcytic    Hypoparathyroidism ([facility] [address]     Iron deficiency anemia     Dr. Cristobal Reyes infusions years ago    Prolonged emergence from general anesthesia     Vitiligo            There were no vitals filed for this visit. Work History/Social History: Foot and ankle history:     Focused Lower Extremity Physical Exam:    Neurovascular examination:    Dorsalis Pedis palpable bilateral.  Posterior tibialis palpable bilateral.    Capillary Refill Time:  Immediate return  Hair growth:  Symmetrical and bilateral   Skin:  Not atrophic  Edema: No edema bilateral feet or ankles.   Neurologic:  Light touch intact bilateral. Musculoskeletal/ Orthopedic examination:    Equinis: Absent bilateral  Dorsiflexion, plantarflexion, inversion, eversion bilateral 5 out of 5 muscle strength  Wiggling toes  Negative Homans  Decreased dorsiflexion plantarflexion right first metatarsophalangeal joint. No pain overlying right first metatarsophalangeal joint. No pain plantar first metatarsal head. There is tenderness lateral band right plantar fascia. Negative calcaneal squeeze test.  Negative Salas. No significant pain insertion Achilles tendon. Dermatology examination:    No open skin lesions or abrasions bilateral lower extremity. Assessment and Plan:  Bobo Ramírez was seen today for follow-up and foot pain. Diagnoses and all orders for this visit:    Pain of right heel    Plantar fascial fibromatosis  -     Amb External Referral To Physical Therapy      Follow-up plantar fasciitis right and hallux limitus  Continues having right heel pain  Allergy to prednisone. Would not recommend cortisone shots   We did discuss Cam walking boot versus night splint. She like to hold off on walking boot at this time. I did still recommend supportive walking shoe in the house avoid barefoot. We did discuss frozen water bottle bottom right heel 10 minutes each night. I did place referral to formal physical therapy. We did discuss [facility] in [place] for deep tissue massage and Achilles tendon stretching and strengthening for right plantar fasciitis. If continued symptoms we did discuss MRI. Follow-up in office 6 weeks      No follow-ups on file. Seen By:  Deisy Kirby DPM      Document was created using voice recognition software. Note was reviewed, however may contain grammatical errors.

## 2022-10-28 ENCOUNTER — HOSPITAL ENCOUNTER (OUTPATIENT)
Dept: ULTRASOUND IMAGING | Age: 47
Discharge: HOME | End: 2022-10-30
Payer: COMMERCIAL

## 2022-10-28 DIAGNOSIS — E04.1 THYROID NODULE: ICD-10-CM

## 2022-10-28 PROCEDURE — 76536 US EXAM OF HEAD AND NECK: CPT

## 2022-11-22 ENCOUNTER — TELEPHONE (OUTPATIENT)
Dept: INTERNAL MEDICINE | Age: 47
End: 2022-11-22

## 2022-11-22 NOTE — TELEPHONE ENCOUNTER
----- Message from Maggi Deng sent at 11/22/2022  8:45 AM EST -----  Subject: Appointment Request    Reason for Call: Established Patient Appointment needed: Routine Existing   Condition Follow Up    QUESTIONS    Reason for appointment request? No appointments available during search     Additional Information for Provider? Pt requesting to reschedule her 3   month f/u with Dr. Celeste Tran for next week any day 8:30-11:00 if possible.    please advise.  ---------------------------------------------------------------------------  --------------  Abebe Kerr INFO  [phone]; OK to leave message on voicemail  ---------------------------------------------------------------------------  --------------  SCRIPT ANSWERS  COVID Screen: Wale Deleon

## 2022-11-29 NOTE — PROGRESS NOTES
The following care gaps have been identified:  Flu - declined  Covid series - patient encouraged to obtain  Hep C screen - had when pregnant with son who is 15  HIV screen  Miladys Trevino LPN

## 2022-11-30 NOTE — PROGRESS NOTES
[facility] Internal Medicine      SUBJECTIVE:  Kassie Cedillo (:  1975) is a 56 y.o. female here for evaluation of the following chief complaint(s):  Results  Multiple food allergies with alternating constipation and diarrhea - ?pesiticide reaction as was symptom free on vacation. Christie Becerra does relate that she has concluded that some of her symptoms are from anxiety. However, she does not feel that all of them can be attributed to this fully. Pesticide allergies usually with skin and respiratory issues. Patient peeling skin from fruits and veggies and following organic diet. Stable currently and monitoring    Discussed this with Dr. Daniel Camargo - there is functional medicine testing that is available from commercial labs that can be performed if Christie Becerra is interested. Additionally, probiotic supplementation +/- tumeric supplementation can also be tried to improve symptoms. Christie Becerra is on a probiotic currently. Passed decadron challenge - this can be used for PO or IV treatment. OK for topical steroids. Avoidance of prednisone is necessary. Prednisone is already listed as an allergy on the chart. Anxiety - off of all medications - Christie Becerra feels that this is stable at this time. Monitor for worsening. Plantar fasciitis - Seeing podiatry. Last visit was on 2022. Was referred to PT for this issue. If further symptoms, may have an MRI completed. Was to follow in 6 weeks. Christie Becerra prefers to be diligent with home exercises at this time. Continue treatment as per podiatry. Thyroid nodule - stable and no concerning adenopathy. No further follow-up is recommended. Perioral dermatitis - has seen dr. Diaz Ritter in the past.  Would like a new referral.    Referral placed to [facility] - dermatology. Reports numbness in her feet and a feeling that her feet are cold. - I suspect Raynaud's phenomenon. Follow for worsening. Counseled on keeping hands and feet warm. Review of Systems as above. Current Outpatient Medications on File Prior to Visit   Medication Sig Dispense Refill    triamcinolone (KENALOG) 0.1 % cream APPLY TO AFFECTED AREA 2 TIMES DAILY AS NEEDED FOR IRRITATION       No current facility-administered medications on file prior to visit. OBJECTIVE:    VS:   Vitals:    12/01/22 0950   BP: 122/87   Site: Left Upper Arm   Position: Sitting   Cuff Size: Medium Adult   Pulse: 68   Resp: 16   SpO2: 98%   Weight: 147 lb (66.7 kg)   Height: 5' 7\" (1.702 m)     Physical Exam   HEENT:  +perioral dermatitis noted around lips. Lungs:  CTA B  Neck:   No carotid bruits appreciated B.   CVS:  +s1/s2 without m/g/r appreciated. Abd:  + BS, NTND, No renal or aortic bruits   Extr:  2+ DP/PT pulses B, no pitting edema    RTC:  Return in about 3 months (around 3/1/2023).       Marilin Garcia MD   12/2/2022 11:41 AM

## 2022-12-01 ENCOUNTER — OFFICE VISIT (OUTPATIENT)
Dept: INTERNAL MEDICINE | Age: 47
End: 2022-12-01
Payer: COMMERCIAL

## 2022-12-01 VITALS
RESPIRATION RATE: 16 BRPM | BODY MASS INDEX: 23.07 KG/M2 | DIASTOLIC BLOOD PRESSURE: 87 MMHG | SYSTOLIC BLOOD PRESSURE: 122 MMHG | HEART RATE: 68 BPM | WEIGHT: 147 LBS | OXYGEN SATURATION: 98 % | HEIGHT: 67 IN

## 2022-12-01 DIAGNOSIS — K90.0 CELIAC DISEASE: ICD-10-CM

## 2022-12-01 DIAGNOSIS — L80 VITILIGO: ICD-10-CM

## 2022-12-01 DIAGNOSIS — L71.0 PERIORAL DERMATITIS: ICD-10-CM

## 2022-12-01 DIAGNOSIS — R11.0 NAUSEA: ICD-10-CM

## 2022-12-01 DIAGNOSIS — Z23 FLU VACCINE NEED: Primary | ICD-10-CM

## 2022-12-01 DIAGNOSIS — M72.2 PLANTAR FASCIITIS: ICD-10-CM

## 2022-12-01 PROCEDURE — 99213 OFFICE O/P EST LOW 20 MIN: CPT | Performed by: INTERNAL MEDICINE

## 2022-12-01 RX ORDER — ONDANSETRON 4 MG/1
4 TABLET, FILM COATED ORAL 3 TIMES DAILY PRN
Qty: 15 TABLET | Refills: 0 | Status: SHIPPED | OUTPATIENT
Start: 2022-12-01

## 2022-12-02 PROBLEM — R10.84 GENERALIZED ABDOMINAL PAIN: Status: RESOLVED | Noted: 2021-05-24 | Resolved: 2022-12-02

## 2022-12-02 PROBLEM — M72.2 PLANTAR FASCIITIS: Status: ACTIVE | Noted: 2022-12-02

## 2022-12-11 ENCOUNTER — OFFICE VISIT (OUTPATIENT)
Dept: FAMILY MEDICINE CLINIC | Age: 47
End: 2022-12-11

## 2022-12-11 VITALS
SYSTOLIC BLOOD PRESSURE: 138 MMHG | WEIGHT: 146.4 LBS | DIASTOLIC BLOOD PRESSURE: 88 MMHG | OXYGEN SATURATION: 99 % | RESPIRATION RATE: 20 BRPM | HEART RATE: 73 BPM | BODY MASS INDEX: 22.93 KG/M2 | TEMPERATURE: 97.9 F

## 2022-12-11 DIAGNOSIS — B34.9 VIRAL ILLNESS: Primary | ICD-10-CM

## 2022-12-11 DIAGNOSIS — J02.9 SORE THROAT: ICD-10-CM

## 2022-12-11 DIAGNOSIS — R68.89 FLU-LIKE SYMPTOMS: ICD-10-CM

## 2022-12-11 LAB
INFLUENZA A ANTIGEN, POC: NEGATIVE
INFLUENZA B ANTIGEN, POC: NEGATIVE
Lab: NORMAL
PERFORMING INSTRUMENT: NORMAL
QC PASS/FAIL: NORMAL
S PYO AG THROAT QL: NORMAL
SARS-COV-2, POC: NORMAL

## 2022-12-11 NOTE — PROGRESS NOTES
22  Saba Slaughter : 1975 Sex: female  Age 56 y.o. Subjective:  Chief Complaint   Patient presents with    Pharyngitis     Sore throat since yesterday, pt spoke to Dr. Jayna Goss yesterday she advised patient to come in and be swabbed for covid, flu, and strept       HPI:   Saba Slaughter , 56 y.o. female presents to the clinic for evaluation of sore throat that started yesterday. The patient also reports sinus congestion, nausea / vomiting / diarrhea (Tues, Wed) of last week. The patient has taken Dayquil / Nyquil for symptoms. The patient reports resolved sinus congestion and N/V/D symptoms over time. The patient denies known exposure. The patient reports  hx of COVID-19. The patient reports negative home COVID-19 test last week. The patient denies ear discomfort, headache, cough, rash, and fever. The patient also denies chest pain, abdominal pain, and shortness of breath. ROS:   Unless otherwise stated in this report the patient's positive and negative responses for review of systems for constitutional, eyes, ENT, cardiovascular, respiratory, gastrointestinal, neurological, , musculoskeletal, and integument systems and related systems to the presenting problem are either stated in the history of present illness or were not pertinent or were negative for the symptoms and/or complaints related to the presenting medical problem. Positives and pertinent negatives as per HPI. All others reviewed and are negative.       PMH:     Past Medical History:   Diagnosis Date    Celiac disease     Colitis     lymphcytic    Hypoparathyroidism ([facility] [address]     Iron deficiency anemia     Dr. Ania De La Garza infusions years ago    Prolonged emergence from general anesthesia     Vitiligo        Past Surgical History:   Procedure Laterality Date    ABDOMINAL EXPLORATION SURGERY      Exploratory laparoscopy for endometriosis    COLONOSCOPY      h/o polyps    COLONOSCOPY N/A 2021    COLONOSCOPY WITH BIOPSY performed by Harjinder Cummins MD at [address] [facility]  2016    ESOPHAGEAL MOTILITY STUDY N/A 12/12/2019    ESOPHAGEAL MOTILITY/MANOMETRY STUDY performed by Tanya Kehr, MD at [address]  age 7    UPPER GASTROINTESTINAL ENDOSCOPY      with esophageal dilation - Dr. Edison Birmingham N/A 05/24/2021    EGD BIOPSY performed by Harjinder Cummins MD at [place] [address] History   Problem Relation Age of Onset    Cancer Mother 48        breast    Breast Cancer Mother 48    Heart Disease Father         CABG x 3, s/p mitral valve and MAZE procedure    High Blood Pressure Father     High Cholesterol Father     Diabetes Maternal Aunt     Breast Cancer Maternal Aunt     Other Sister     Other Brother         psoriasis    Heart Disease Maternal Grandmother     Cancer Maternal Grandfather         stomach    Other Paternal Grandmother         Parkinson's and Alzheimers    Heart Disease Paternal Grandfather     Breast Cancer Maternal Aunt        Medications:     Current Outpatient Medications:     Magic Mouthwash (MIRACLE MOUTHWASH), Swish and swallow 5 mLs 4 times daily as needed for Irritation 1- part Maalox, 1- part benadryl, and 1- part lidocaine viscous. , Disp: 150 mL, Rfl: 0    ondansetron (ZOFRAN) 4 MG tablet, Take 1 tablet by mouth 3 times daily as needed for Nausea or Vomiting, Disp: 15 tablet, Rfl: 0    triamcinolone (KENALOG) 0.1 % cream, APPLY TO AFFECTED AREA 2 TIMES DAILY AS NEEDED FOR IRRITATION, Disp: , Rfl:     Allergies: Allergies   Allergen Reactions    Methylprednisolone Other (See Comments)     Passed out. Police called for welfare check    Prednisone Other (See Comments)     9/2/22 Avoid prednisone per Dr. Aceves     Codeine Nausea And Vomiting       Social History:     Social History     Tobacco Use    Smoking status: Never    Smokeless tobacco: Never   Vaping Use    Vaping Use: Never used   Substance Use Topics    Alcohol use:  No Drug use: No       Physical Exam:     Vitals:    12/11/22 0848   BP: 138/88   Site: Right Upper Arm   Position: Sitting   Cuff Size: Medium Adult   Pulse: 73   Resp: 20   Temp: 97.9 °F (36.6 °C)   SpO2: 99%   Weight: 146 lb 6.4 oz (66.4 kg)       Physical Exam (PE)    Physical Exam  Constitutional:       Appearance: Normal appearance. HENT:      Head: Normocephalic. Right Ear: Ear canal and external ear normal. A middle ear effusion is present. Left Ear: Ear canal and external ear normal. A middle ear effusion is present. Nose: Congestion and rhinorrhea present. Right Sinus: No maxillary sinus tenderness or frontal sinus tenderness. Left Sinus: No maxillary sinus tenderness or frontal sinus tenderness. Mouth/Throat:      Mouth: Mucous membranes are moist.      Pharynx: Oropharynx is clear. No oropharyngeal exudate or posterior oropharyngeal erythema. Eyes:      Pupils: Pupils are equal, round, and reactive to light. Cardiovascular:      Rate and Rhythm: Normal rate and regular rhythm. Pulses: Normal pulses. Heart sounds: Normal heart sounds. Pulmonary:      Effort: Pulmonary effort is normal.      Breath sounds: Normal breath sounds. No wheezing, rhonchi or rales. Abdominal:      General: Bowel sounds are normal.      Palpations: Abdomen is soft. Musculoskeletal:         General: Normal range of motion. Cervical back: Normal range of motion and neck supple. Lymphadenopathy:      Cervical: No cervical adenopathy. Skin:     General: Skin is warm and dry. Capillary Refill: Capillary refill takes less than 2 seconds. Neurological:      General: No focal deficit present. Mental Status: She is alert and oriented to person, place, and time.    Psychiatric:         Mood and Affect: Mood normal.         Behavior: Behavior normal.        Testing:   (All laboratory and radiology results have been personally reviewed by myself)  Labs:  Results for orders placed or performed in visit on 12/11/22   POCT rapid strep A   Result Value Ref Range    Strep A Ag None Detected None Detected   POCT COVID-19, Antigen   Result Value Ref Range    SARS-COV-2, POC Not-Detected Not Detected    Lot Number [id number]     QC Pass/Fail pass     Performing Instrument BD Veritor    POCT Influenza A/B Antigen (BD Veritor)   Result Value Ref Range    Inflenza A Ag NEGATIVE     Influenza B Ag NEGATIVE        Imaging: All Radiology results interpreted by Radiologist unless otherwise noted. No orders to display       Assessment / Plan:   The patient's vitals, allergies, medications, and past medical history have been reviewed. Gunanr Miller was seen today for pharyngitis. Diagnoses and all orders for this visit:    Viral illness    Sore throat  -     POCT rapid strep A  -     Magic Mouthwash (MIRACLE MOUTHWASH); Swish and swallow 5 mLs 4 times daily as needed for Irritation 1- part Maalox, 1- part benadryl, and 1- part lidocaine viscous. Flu-like symptoms  -     POCT COVID-19, Antigen  -     POCT Influenza A/B Antigen (BD Veritor)      - Disposition: Home    - Educational material printed for patient's review and were included in patient instructions. After Visit Summary was given to patient at the end of visit. - Encouraged oral fluids and rest. Discussed symptomatic treatments with patient today. The patient is to schedule a follow-up with PCP in the next 2-3 days for reevaluation. Red flag symptoms were also discussed with the patient today. If symptoms worsen the patient is to go directly to the emergency department for reevaluation and treatment. Pt verbalizes understanding and is in agreement with plan of care. All questions answered. SIGNATURE: DEEPIKA Fraire    *NOTE: This report was transcribed using voice recognition software. Every effort was made to ensure accuracy; however, inadvertent computerized transcription errors may be present.

## 2022-12-22 LAB — MAMMOGRAPHY, EXTERNAL: NEGATIVE

## 2022-12-24 ENCOUNTER — OFFICE VISIT (OUTPATIENT)
Dept: FAMILY MEDICINE CLINIC | Age: 47
End: 2022-12-24
Payer: COMMERCIAL

## 2022-12-24 VITALS
OXYGEN SATURATION: 97 % | BODY MASS INDEX: 22.76 KG/M2 | DIASTOLIC BLOOD PRESSURE: 80 MMHG | HEIGHT: 67 IN | TEMPERATURE: 97.3 F | WEIGHT: 145 LBS | HEART RATE: 63 BPM | SYSTOLIC BLOOD PRESSURE: 122 MMHG

## 2022-12-24 DIAGNOSIS — R05.9 COUGH, UNSPECIFIED TYPE: Primary | ICD-10-CM

## 2022-12-24 DIAGNOSIS — J02.9 SORE THROAT: ICD-10-CM

## 2022-12-24 LAB
INFLUENZA A ANTIBODY: NORMAL
INFLUENZA B ANTIBODY: NORMAL
Lab: NORMAL
PERFORMING INSTRUMENT: NORMAL
QC PASS/FAIL: NORMAL
S PYO AG THROAT QL: NORMAL
SARS-COV-2, POC: NORMAL

## 2022-12-24 PROCEDURE — 87880 STREP A ASSAY W/OPTIC: CPT | Performed by: FAMILY MEDICINE

## 2022-12-24 PROCEDURE — 87804 INFLUENZA ASSAY W/OPTIC: CPT | Performed by: FAMILY MEDICINE

## 2022-12-24 PROCEDURE — 87426 SARSCOV CORONAVIRUS AG IA: CPT | Performed by: FAMILY MEDICINE

## 2022-12-24 PROCEDURE — 99213 OFFICE O/P EST LOW 20 MIN: CPT | Performed by: FAMILY MEDICINE

## 2022-12-24 RX ORDER — AZITHROMYCIN 250 MG/1
TABLET, FILM COATED ORAL
Qty: 1 PACKET | Refills: 0 | Status: SHIPPED | OUTPATIENT
Start: 2022-12-24

## 2022-12-24 ASSESSMENT — ENCOUNTER SYMPTOMS
COUGH: 1
GASTROINTESTINAL NEGATIVE: 1
EYES NEGATIVE: 1
ALLERGIC/IMMUNOLOGIC NEGATIVE: 1

## 2022-12-24 NOTE — PROGRESS NOTES
22     Virginia Gonzalez    : 1975 Sex: female   Age: 56 y.o. Chief Complaint   Patient presents with    Congestion     Wants tested for everyone. Pharyngitis       Prior to Admission medications    Medication Sig Start Date End Date Taking? Authorizing Provider   azithromycin (ZITHROMAX Z-SHARMILA) 250 MG tablet 2 today  Then 1 qd  4 days 22  Yes Wilfredo Mart DO   Magic Mouthwash (MIRACLE MOUTHWASH) Swish and swallow 5 mLs 4 times daily as needed for Irritation 1- part Maalox, 1- part benadryl, and 1- part lidocaine viscous. 22  Yes Kennedy Lowe, APRN - CNP   ondansetron (ZOFRAN) 4 MG tablet Take 1 tablet by mouth 3 times daily as needed for Nausea or Vomiting 22  Yes Lion Ponce MD   triamcinolone (KENALOG) 0.1 % cream APPLY TO AFFECTED AREA 2 TIMES DAILY AS NEEDED FOR IRRITATION 22  Yes Historical Provider, MD          HPI: Evaluated today with upper respiratory cough sore throat congestion. Denies fever chills. Flu testing strep testing and COVID testing was negative. Patient reassured. Mild respiratory congestion I am going to cover with Zithromax x1 Tylenol rest fluids and then follow-up if worsening symptoms. Review of Systems   Constitutional: Negative. HENT:  Positive for congestion. Eyes: Negative. Respiratory:  Positive for cough. Gastrointestinal: Negative. Endocrine: Negative. Genitourinary: Negative. Musculoskeletal: Negative. Skin: Negative. Allergic/Immunologic: Negative. Neurological: Negative. Hematological: Negative. Psychiatric/Behavioral: Negative. Current Outpatient Medications:     azithromycin (ZITHROMAX Z-SHARMILA) 250 MG tablet, 2 today  Then 1 qd  4 days, Disp: 1 packet, Rfl: 0    Magic Mouthwash (MIRACLE MOUTHWASH), Swish and swallow 5 mLs 4 times daily as needed for Irritation 1- part Maalox, 1- part benadryl, and 1- part lidocaine viscous. , Disp: 150 mL, Rfl: 0    ondansetron (ZOFRAN) 4 MG tablet, Take 1 tablet by mouth 3 times daily as needed for Nausea or Vomiting, Disp: 15 tablet, Rfl: 0    triamcinolone (KENALOG) 0.1 % cream, APPLY TO AFFECTED AREA 2 TIMES DAILY AS NEEDED FOR IRRITATION, Disp: , Rfl:     Allergies   Allergen Reactions    Methylprednisolone Other (See Comments)     Passed out.  Police called for welfare check    Prednisone Other (See Comments)     9/2/22 Avoid prednisone per Dr. Rachel Scott Nausea And Vomiting       Social History     Tobacco Use    Smoking status: Never    Smokeless tobacco: Never   Vaping Use    Vaping Use: Never used   Substance Use Topics    Alcohol use: No    Drug use: No      Past Surgical History:   Procedure Laterality Date    ABDOMINAL EXPLORATION SURGERY      Exploratory laparoscopy for endometriosis    COLONOSCOPY  2015    h/o polyps    COLONOSCOPY N/A 05/24/2021    COLONOSCOPY WITH BIOPSY performed by Sissy Truong MD at [address] [facility]  2016    ESOPHAGEAL MOTILITY STUDY N/A 12/12/2019    ESOPHAGEAL MOTILITY/MANOMETRY STUDY performed by Leslee Cerrato MD at [address]  age 7    UPPER GASTROINTESTINAL ENDOSCOPY      with esophageal dilation - Dr. Konrad Brittle N/A 05/24/2021    EGD BIOPSY performed by Sissy Truong MD at [address] [facility] History   Problem Relation Age of Onset    Cancer Mother 48        breast    Breast Cancer Mother 48    Heart Disease Father         CABG x 3, s/p mitral valve and MAZE procedure    High Blood Pressure Father     High Cholesterol Father     Diabetes Maternal Aunt     Breast Cancer Maternal Aunt     Other Sister     Other Brother         psoriasis    Heart Disease Maternal Grandmother     Cancer Maternal Grandfather         stomach    Other Paternal Grandmother         Parkinson's and Alzheimers    Heart Disease Paternal Grandfather     Breast Cancer Maternal Aunt      Past Medical History:   Diagnosis Date    Celiac disease     Colitis     lymphcytic    Hypoparathyroidism ([facility] [address]     Iron deficiency anemia     Dr. Kirsten James infusions years ago    Prolonged emergence from general anesthesia     Vitiligo        Vitals:    12/24/22 0844   BP: 122/80   Pulse: 63   Temp: 97.3 °F (36.3 °C)   SpO2: 97%   Weight: 145 lb (65.8 kg)   Height: 5' 7\" (1.702 m)     BP Readings from Last 3 Encounters:   12/24/22 122/80   12/11/22 138/88   12/01/22 122/87        Physical Exam  Nursing note reviewed. Blood pressure controlled. HEENT increased drainage posterior pharynx and nares. Throat minimally erythematous. Mild cough. Lungs clear on auscultation. Abdomen benign. Testing negative as noted. Treatment as noted and follow-up if persistent. Plan Per Assessment:  Christie Becerra was seen today for congestion and pharyngitis. Diagnoses and all orders for this visit:    Cough, unspecified type  -     POCT COVID-19, Antigen  -     POCT Influenza A/B  -     POCT rapid strep A  -     azithromycin (ZITHROMAX Z-SHARMILA) 250 MG tablet; 2 today  Then 1 qd  4 days    Sore throat  -     azithromycin (ZITHROMAX Z-SHARMILA) 250 MG tablet; 2 today  Then 1 qd  4 days          No follow-ups on file. Jenny Wright DO    Note was generated with the assistance of voice recognition software. Document was reviewed however may contain grammatical errors.

## 2022-12-26 ENCOUNTER — TELEPHONE (OUTPATIENT)
Dept: PRIMARY CARE CLINIC | Age: 47
End: 2022-12-26

## 2022-12-26 RX ORDER — OSELTAMIVIR PHOSPHATE 75 MG/1
75 CAPSULE ORAL 2 TIMES DAILY
Qty: 10 CAPSULE | Refills: 0 | Status: SHIPPED | OUTPATIENT
Start: 2022-12-26 | End: 2022-12-31

## 2023-01-23 PROBLEM — J02.9 SORE THROAT: Status: RESOLVED | Noted: 2022-12-24 | Resolved: 2023-01-23

## 2023-01-23 PROBLEM — R05.9 COUGH: Status: RESOLVED | Noted: 2022-12-24 | Resolved: 2023-01-23

## 2023-03-03 NOTE — PROGRESS NOTES
The following care gaps have been identified:  Breast Cancer Screening - just had a [facility]  HIV  Hep C  Covid series - declined  Flu - declined  Chi Parker LPN    Seen Dr. James Farfan about 2 weeks ago

## 2023-03-06 ENCOUNTER — OFFICE VISIT (OUTPATIENT)
Dept: INTERNAL MEDICINE | Age: 48
End: 2023-03-06
Payer: COMMERCIAL

## 2023-03-06 VITALS
HEART RATE: 83 BPM | RESPIRATION RATE: 18 BRPM | HEIGHT: 67 IN | DIASTOLIC BLOOD PRESSURE: 81 MMHG | OXYGEN SATURATION: 97 % | SYSTOLIC BLOOD PRESSURE: 124 MMHG | BODY MASS INDEX: 21.66 KG/M2 | WEIGHT: 138 LBS | TEMPERATURE: 97.2 F

## 2023-03-06 DIAGNOSIS — L70.9 ACNE, UNSPECIFIED ACNE TYPE: ICD-10-CM

## 2023-03-06 DIAGNOSIS — S92.901S CLOSED FRACTURE OF RIGHT FOOT, SEQUELA: Primary | ICD-10-CM

## 2023-03-06 DIAGNOSIS — F41.9 ANXIETY: ICD-10-CM

## 2023-03-06 PROCEDURE — 99213 OFFICE O/P EST LOW 20 MIN: CPT | Performed by: INTERNAL MEDICINE

## 2023-03-06 RX ORDER — SPIRONOLACTONE 50 MG/1
TABLET, FILM COATED ORAL
COMMUNITY
Start: 2023-02-17

## 2023-03-06 ASSESSMENT — PATIENT HEALTH QUESTIONNAIRE - PHQ9
7. TROUBLE CONCENTRATING ON THINGS, SUCH AS READING THE NEWSPAPER OR WATCHING TELEVISION: 0
SUM OF ALL RESPONSES TO PHQ QUESTIONS 1-9: 0
6. FEELING BAD ABOUT YOURSELF - OR THAT YOU ARE A FAILURE OR HAVE LET YOURSELF OR YOUR FAMILY DOWN: 0
2. FEELING DOWN, DEPRESSED OR HOPELESS: 0
9. THOUGHTS THAT YOU WOULD BE BETTER OFF DEAD, OR OF HURTING YOURSELF: 0
5. POOR APPETITE OR OVEREATING: 0
SUM OF ALL RESPONSES TO PHQ QUESTIONS 1-9: 0
4. FEELING TIRED OR HAVING LITTLE ENERGY: 0
SUM OF ALL RESPONSES TO PHQ QUESTIONS 1-9: 0
8. MOVING OR SPEAKING SO SLOWLY THAT OTHER PEOPLE COULD HAVE NOTICED. OR THE OPPOSITE, BEING SO FIGETY OR RESTLESS THAT YOU HAVE BEEN MOVING AROUND A LOT MORE THAN USUAL: 0
SUM OF ALL RESPONSES TO PHQ9 QUESTIONS 1 & 2: 0
10. IF YOU CHECKED OFF ANY PROBLEMS, HOW DIFFICULT HAVE THESE PROBLEMS MADE IT FOR YOU TO DO YOUR WORK, TAKE CARE OF THINGS AT HOME, OR GET ALONG WITH OTHER PEOPLE: 0
SUM OF ALL RESPONSES TO PHQ QUESTIONS 1-9: 0
3. TROUBLE FALLING OR STAYING ASLEEP: 0
1. LITTLE INTEREST OR PLEASURE IN DOING THINGS: 0

## 2023-03-06 NOTE — PROGRESS NOTES
[facility] Internal Medicine      SUBJECTIVE:  Laura Downs (:  1975) is a 56 y.o. female here for evaluation of the following chief complaint(s):  Headache (Headache for 2 days, lives in [place], dogs have been throwing up clear liquid)  Headache - 2 days in duration. [place]. No other symptoms   Follow for now and return if worsening. Went to GYN and started to get episodes of diaphoresis. Food allergies with alternating constipation and diarrhea - was considering functional medicine testing. Decided against performing this test.  Has started psyllium husk powder - this has helped to regulate bowel movements and  decreased episodes of diarrhea. Continue same and follow for symptom worsening. Anxiety - was off of all medications as of last visit. Feels that she is doing well. Continue off of all medications and follow-up for worsening of symptoms    Plantar fasciitis - improved. New footwear has helped. Has 2 fractures in her foot - R sided. No other injury. Check DEXA scan as these fractures have occurred without any inciting incident. Additionally, with nutritional issues and celiac disease, nutritional deficiencies may be leading to these fractures and potentially osteoporosis. Perioral dermatitis - was referred to Aziza Garza at last visit. Will trial aldactone first.     Check BMP in 2 weeks after starting Aldactone. Review of Systems as above.      Current Outpatient Medications on File Prior to Visit   Medication Sig Dispense Refill    spironolactone (ALDACTONE) 50 MG tablet TAKE ONE TABLET BY MOUTH DAILY      psyllium (METAMUCIL) 58.6 % packet Take 1 packet by mouth daily      ondansetron (ZOFRAN) 4 MG tablet Take 1 tablet by mouth 3 times daily as needed for Nausea or Vomiting 15 tablet 0    triamcinolone (KENALOG) 0.1 % cream APPLY TO AFFECTED AREA 2 TIMES DAILY AS NEEDED FOR IRRITATION       No current facility-administered medications on file prior to visit. OBJECTIVE:    VS:   Vitals:    03/06/23 0804   BP: 124/81   Site: Right Upper Arm   Position: Sitting   Cuff Size: Medium Adult   Pulse: 83   Resp: 18   Temp: 97.2 °F (36.2 °C)   TempSrc: Temporal   SpO2: 97%   Weight: 138 lb (62.6 kg)   Height: 5' 7\" (1.702 m)     Physical Exam   HEENT:  PERRL, EOMI, TMs clear B without fluid or erythema. Lungs:  CTA B  Neck:   No carotid bruits appreciated B.   CVS:  +s1/s2 without m/g/r appreciated. Abd:  + BS, NTND, No renal or aortic bruits   Extr:  2+ DP/PT pulses B, no pitting edema    RTC:  Return in about 6 months (around 9/6/2023).         Jesse Lau MD   3/6/2023 8:49 AM

## 2023-03-20 ENCOUNTER — HOSPITAL ENCOUNTER (OUTPATIENT)
Age: 48
Discharge: HOME | End: 2023-03-20
Payer: COMMERCIAL

## 2023-03-20 DIAGNOSIS — L70.9 ACNE, UNSPECIFIED ACNE TYPE: ICD-10-CM

## 2023-03-20 LAB
ANION GAP SERPL CALCULATED.3IONS-SCNC: 6 MMOL/L (ref 7–16)
BUN SERPL-MCNC: 10 MG/DL (ref 6–20)
CALCIUM SERPL-MCNC: 9.4 MG/DL (ref 8.6–10.2)
CHLORIDE SERPL-SCNC: 103 MMOL/L (ref 98–107)
CO2 SERPL-SCNC: 29 MMOL/L (ref 22–29)
CREAT SERPL-MCNC: 0.9 MG/DL (ref 0.5–1)
GLUCOSE SERPL-MCNC: 89 MG/DL (ref 74–99)
POTASSIUM SERPL-SCNC: 4.2 MMOL/L (ref 3.5–5)
SODIUM SERPL-SCNC: 138 MMOL/L (ref 132–146)

## 2023-03-20 PROCEDURE — 36415 COLL VENOUS BLD VENIPUNCTURE: CPT

## 2023-03-20 PROCEDURE — 80048 BASIC METABOLIC PNL TOTAL CA: CPT

## 2023-04-02 ENCOUNTER — APPOINTMENT (OUTPATIENT)
Dept: GENERAL RADIOLOGY | Age: 48
End: 2023-04-02
Payer: COMMERCIAL

## 2023-04-02 ENCOUNTER — HOSPITAL ENCOUNTER (EMERGENCY)
Age: 48
Discharge: HOME | End: 2023-04-02
Payer: COMMERCIAL

## 2023-04-02 VITALS
OXYGEN SATURATION: 100 % | HEIGHT: 67 IN | DIASTOLIC BLOOD PRESSURE: 78 MMHG | WEIGHT: 132 LBS | HEART RATE: 78 BPM | SYSTOLIC BLOOD PRESSURE: 134 MMHG | RESPIRATION RATE: 16 BRPM | TEMPERATURE: 97.6 F | BODY MASS INDEX: 20.72 KG/M2

## 2023-04-02 DIAGNOSIS — S59.901A INJURY OF RIGHT ELBOW, INITIAL ENCOUNTER: Primary | ICD-10-CM

## 2023-04-02 DIAGNOSIS — S50.01XA CONTUSION OF RIGHT ELBOW, INITIAL ENCOUNTER: ICD-10-CM

## 2023-04-02 PROCEDURE — 73090 X-RAY EXAM OF FOREARM: CPT

## 2023-04-02 PROCEDURE — 99283 EMERGENCY DEPT VISIT LOW MDM: CPT

## 2023-04-02 PROCEDURE — 73070 X-RAY EXAM OF ELBOW: CPT

## 2023-04-02 ASSESSMENT — PAIN SCALES - GENERAL
PAINLEVEL_OUTOF10: 3
PAINLEVEL_OUTOF10: 8

## 2023-04-02 ASSESSMENT — PAIN DESCRIPTION - ORIENTATION
ORIENTATION: RIGHT
ORIENTATION: RIGHT

## 2023-04-02 ASSESSMENT — PAIN DESCRIPTION - DESCRIPTORS
DESCRIPTORS: ACHING
DESCRIPTORS: DISCOMFORT;BURNING

## 2023-04-02 ASSESSMENT — PAIN DESCRIPTION - LOCATION
LOCATION: ARM
LOCATION: ELBOW

## 2023-04-02 ASSESSMENT — PAIN - FUNCTIONAL ASSESSMENT
PAIN_FUNCTIONAL_ASSESSMENT: 0-10
PAIN_FUNCTIONAL_ASSESSMENT: 0-10

## 2023-04-02 NOTE — ED PROVIDER NOTES
There is localized swelling and tenderness noted along the medial right elbow. And proximal radius/ulna. Integumentary: Skin warm and dry. No rashes, lesions, or erythema. There is no laceration or evidence of open fracture. No bruising. No abscess. Neurologic: GCS 15, no focal deficits, neurovascularly intact.    ------------------------- NURSING NOTES AND VITALS REVIEWED ---------------------------   The nursing notes within the ED encounter and vital signs as below have been reviewed by myself. BP (!) 131/99   Pulse 74   Temp 97.9 °F (36.6 °C) (Tympanic)   Resp 16   Ht 5' 7\" (1.702 m)   Wt 132 lb (59.9 kg)   SpO2 100%   BMI 20.67 kg/m²   Oxygen Saturation Interpretation: Normal    The patients available past medical records and past encounters were reviewed. -------------------------------------------------- RESULTS -------------------------------------------------  I have personally reviewed all laboratory and imaging results for this patient. Results are listed below. LABS:  No results found for this visit on 04/02/23. RADIOLOGY:  Interpreted by Radiologist.  XR ELBOW RIGHT (2 VIEWS)   Final Result   No acute osseous injury of left elbow or forearm. XR RADIUS ULNA RIGHT (2 VIEWS)   Final Result   No acute osseous injury of left elbow or forearm. ------------------------------ ED COURSE/MEDICAL DECISION MAKING----------------------  Medications - No data to display          Medical decision making:   History From: pt  Limitations to history : None  Chronic Conditions: none pertinent  CONSULTS: (Who and What was discussed):None  Discussion with Other Profesionals : None  Social Determinants : None  Records Reviewed : None  Appropriate for outpatient management    I am the Primary Clinician of Record.      CC/HPI Summary, DDx, ED Course, and Reassessment:     Patient presents to the emergency department today for evaluation of right elbow following an

## 2023-04-03 ENCOUNTER — HOSPITAL ENCOUNTER (OUTPATIENT)
Dept: MAMMOGRAPHY | Age: 48
Discharge: HOME | End: 2023-04-05
Payer: COMMERCIAL

## 2023-04-03 ENCOUNTER — TELEPHONE (OUTPATIENT)
Dept: INTERNAL MEDICINE | Age: 48
End: 2023-04-03

## 2023-04-03 DIAGNOSIS — S92.901S CLOSED FRACTURE OF RIGHT FOOT, SEQUELA: ICD-10-CM

## 2023-04-03 PROCEDURE — 77080 DXA BONE DENSITY AXIAL: CPT

## 2023-04-03 NOTE — TELEPHONE ENCOUNTER
----- Message from Adela Wick MD sent at 4/3/2023  2:16 PM EDT -----  Please let Marylen Dixon know that her DEXA scan results are normal.     Adela Wick MD  4/3/2023

## 2023-07-22 NOTE — TELEPHONE ENCOUNTER
Patient notified
Patient still has not heard from the [facility] regarding moving her appt up and she would like to know if you can call in a rx for Prednisone to her pharmacy. She said if she hears from them later this afternoon she will not fill the rx.
4 = No assist / stand by assistance

## 2023-11-21 NOTE — PROGRESS NOTES
[address] [facility] Internal Medicine      SUBJECTIVE:  Angelita Padilla (:  1975) is a 53 y.o. female here for evaluation of the following chief complaint(s): Anxiety, Thyroid nodule, and Iron Deficiency  Headaches - conservative management was being taken as of last visit in March. Still with HA 2-3 times per week. Has seen the eye doctor. Dull. States that she has neck pain as well. Sarah Fermin reports poor posture as well. Neck exercises provided to Sarah Fermin. Continue massage therapy. Likely musculoskeletal in nature. Monitor with exercise and continue massotherapy. Feeling lightheadedness and feeling cold. Decreased sleep with school work - stress at times as well. 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 Check CMP/CBC/TSH    Alternating constipation and diarrhea - had started psyllium husk powder at last visit. This was working to regulate bowel movements and decrease diarrhea. Still with morning diarrhea. Adherent to gluten free diet. This has been long-standing and unable to fully arrive at cause of ongoing GI symptoms. Questionably caused by food additives or pesticides as symptoms resolved when Sarah Fermin was on vacation and ate food that was all locally sourced. Anxiety - off all medications and feeling well. Continue to monitor. Plantar fasciitis - improved at last visit with new footwear. Now resolved. Perioral dermatitis - was on a trial of aldactone and follow-up BMP was normal. Exacerbated by wearing surgical mask plus perspiration. Local moisturizer and gentle skin cleanser. Review of Systems - as above. Current Outpatient Medications on File Prior to Visit   Medication Sig Dispense Refill    psyllium (METAMUCIL) 58.6 % packet Take 1 packet by mouth daily       No current facility-administered medications on file prior to visit.        OBJECTIVE:    VS:   Vitals:    23 1039   BP: 125/87   Site: Right Upper

## 2023-11-22 ENCOUNTER — OFFICE VISIT (OUTPATIENT)
Dept: INTERNAL MEDICINE | Age: 48
End: 2023-11-22
Payer: COMMERCIAL

## 2023-11-22 VITALS
RESPIRATION RATE: 18 BRPM | OXYGEN SATURATION: 99 % | HEART RATE: 68 BPM | BODY MASS INDEX: 22.29 KG/M2 | SYSTOLIC BLOOD PRESSURE: 125 MMHG | WEIGHT: 142 LBS | DIASTOLIC BLOOD PRESSURE: 87 MMHG | HEIGHT: 67 IN | TEMPERATURE: 97.6 F

## 2023-11-22 DIAGNOSIS — Z01.84 IMMUNITY STATUS TESTING: ICD-10-CM

## 2023-11-22 DIAGNOSIS — L71.0 PERIORAL DERMATITIS: Primary | ICD-10-CM

## 2023-11-22 DIAGNOSIS — Z11.4 ENCOUNTER FOR SCREENING FOR HIV: ICD-10-CM

## 2023-11-22 DIAGNOSIS — R68.89 COLD INTOLERANCE: ICD-10-CM

## 2023-11-22 DIAGNOSIS — Z12.31 ENCOUNTER FOR SCREENING MAMMOGRAM FOR MALIGNANT NEOPLASM OF BREAST: ICD-10-CM

## 2023-11-22 DIAGNOSIS — Z11.59 ENCOUNTER FOR HEPATITIS C SCREENING TEST FOR LOW RISK PATIENT: ICD-10-CM

## 2023-11-22 LAB
ABSOLUTE IMMATURE GRANULOCYTE: 0.03 K/UL (ref 0–0.58)
ALBUMIN SERPL-MCNC: 4.3 G/DL (ref 3.5–5.2)
ALP BLD-CCNC: 51 U/L (ref 35–104)
ALT SERPL-CCNC: 14 U/L (ref 0–32)
ANION GAP SERPL CALCULATED.3IONS-SCNC: 15 MMOL/L (ref 7–16)
AST SERPL-CCNC: 22 U/L (ref 0–31)
BASOPHILS ABSOLUTE: 0.05 K/UL (ref 0–0.2)
BASOPHILS RELATIVE PERCENT: 1 % (ref 0–2)
BILIRUB SERPL-MCNC: 0.9 MG/DL (ref 0–1.2)
BUN BLDV-MCNC: 7 MG/DL (ref 6–20)
CALCIUM SERPL-MCNC: 9.3 MG/DL (ref 8.6–10.2)
CHLORIDE BLD-SCNC: 106 MMOL/L (ref 98–107)
CO2: 21 MMOL/L (ref 22–29)
CREAT SERPL-MCNC: 0.9 MG/DL (ref 0.5–1)
EOSINOPHILS ABSOLUTE: 0.05 K/UL (ref 0.05–0.5)
EOSINOPHILS RELATIVE PERCENT: 1 % (ref 0–6)
GFR SERPL CREATININE-BSD FRML MDRD: >60 ML/MIN/1.73M2
GLUCOSE BLD-MCNC: 90 MG/DL (ref 74–99)
HCT VFR BLD CALC: 42.2 % (ref 34–48)
HEMOGLOBIN: 13.5 G/DL (ref 11.5–15.5)
IMMATURE GRANULOCYTES: 0 % (ref 0–5)
LYMPHOCYTES ABSOLUTE: 2.09 K/UL (ref 1.5–4)
LYMPHOCYTES RELATIVE PERCENT: 23 % (ref 20–42)
MCH RBC QN AUTO: 28.3 PG (ref 26–35)
MCHC RBC AUTO-ENTMCNC: 32 G/DL (ref 32–34.5)
MCV RBC AUTO: 88.5 FL (ref 80–99.9)
MONOCYTES ABSOLUTE: 0.67 K/UL (ref 0.1–0.95)
MONOCYTES RELATIVE PERCENT: 7 % (ref 2–12)
NEUTROPHILS ABSOLUTE: 6.2 K/UL (ref 1.8–7.3)
NEUTROPHILS RELATIVE PERCENT: 68 % (ref 43–80)
PDW BLD-RTO: 13 % (ref 11.5–15)
PLATELET # BLD: 296 K/UL (ref 130–450)
PMV BLD AUTO: 10.9 FL (ref 7–12)
POTASSIUM SERPL-SCNC: 4.3 MMOL/L (ref 3.5–5)
RBC # BLD: 4.77 M/UL (ref 3.5–5.5)
SODIUM BLD-SCNC: 142 MMOL/L (ref 132–146)
T4 FREE: 1.3 NG/DL (ref 0.9–1.7)
TOTAL PROTEIN: 6.9 G/DL (ref 6.4–8.3)
TSH SERPL DL<=0.05 MIU/L-ACNC: 1.56 UIU/ML (ref 0.27–4.2)
WBC # BLD: 9.1 K/UL (ref 4.5–11.5)

## 2023-11-22 PROCEDURE — 36415 COLL VENOUS BLD VENIPUNCTURE: CPT | Performed by: INTERNAL MEDICINE

## 2023-11-22 SDOH — ECONOMIC STABILITY: FOOD INSECURITY: WITHIN THE PAST 12 MONTHS, THE FOOD YOU BOUGHT JUST DIDN'T LAST AND YOU DIDN'T HAVE MONEY TO GET MORE.: NEVER TRUE

## 2023-11-22 SDOH — ECONOMIC STABILITY: INCOME INSECURITY: HOW HARD IS IT FOR YOU TO PAY FOR THE VERY BASICS LIKE FOOD, HOUSING, MEDICAL CARE, AND HEATING?: NOT HARD AT ALL

## 2023-11-22 SDOH — ECONOMIC STABILITY: FOOD INSECURITY: WITHIN THE PAST 12 MONTHS, YOU WORRIED THAT YOUR FOOD WOULD RUN OUT BEFORE YOU GOT MONEY TO BUY MORE.: NEVER TRUE

## 2023-11-22 NOTE — PROGRESS NOTES
Five tubes of blood (2 gold 2 green 1 lavender) drawn from [facility] at 1140 and sent via tube system. Lab slip scanned to media.  Tashi Jimenez LPN

## 2023-11-24 LAB
HBV SURFACE AB TITR SER: <3.1 MIU/ML (ref 0–9.99)
HEPATITIS C ANTIBODY: NONREACTIVE
HIV AG/AB: NONREACTIVE

## 2023-11-28 ENCOUNTER — TELEPHONE (OUTPATIENT)
Dept: INTERNAL MEDICINE | Age: 48
End: 2023-11-28

## 2023-11-28 NOTE — TELEPHONE ENCOUNTER
----- Message from Lucero Guzman MD sent at 11/27/2023  3:59 PM EST -----  Please let Vasyl Ornelas know that her labs look great. Her hepatitis B surface antibody titer is low indicating that she needs hepatitis B vaccination. If she would like, we can schedule her for the 3 shot series here at the clinic.     Lucero Guzman MD  11/27/2023

## 2023-12-08 ENCOUNTER — NURSE ONLY (OUTPATIENT)
Dept: INTERNAL MEDICINE | Age: 48
End: 2023-12-08
Payer: COMMERCIAL

## 2023-12-08 VITALS — TEMPERATURE: 97.2 F

## 2023-12-08 DIAGNOSIS — Z23 NEED FOR HEPATITIS B VACCINATION: Primary | ICD-10-CM

## 2023-12-08 PROCEDURE — 90746 HEPB VACCINE 3 DOSE ADULT IM: CPT

## 2023-12-08 NOTE — PROGRESS NOTES
Patient given Hep B vaccine in left arm. Tolerated well. No adverse reaction. Appointment made for 2 nd and 3 rd vaccine.  2 nd vaccine will be done at next office visit on 1/24/24

## 2024-01-23 ENCOUNTER — TELEPHONE (OUTPATIENT)
Dept: INTERNAL MEDICINE | Age: 49
End: 2024-01-23

## 2024-01-23 NOTE — TELEPHONE ENCOUNTER
----- Message from Alyssa Yanes sent at 1/23/2024 10:47 AM EST -----  Subject: Message to Provider    QUESTIONS  Information for Provider? Pt needs to have her appt rescheduled that was   tomorrow for follow up and Hep B 2nd vaccine.  stated that nurse   could only schedule this.   ---------------------------------------------------------------------------  --------------  CALL BACK INFO  [phone]; OK to leave message on voicemail  ---------------------------------------------------------------------------  --------------  SCRIPT ANSWERS  Relationship to Patient? Self

## 2024-01-23 NOTE — TELEPHONE ENCOUNTER
Spoke with patient who asked for an appointment after 3:00 pm or Friday. Informed our last appointment is at 3:00 pm and Dr. Avery does not work on Friday. Patient states she will call back because she will have to wait to see her schedule as she is in school. It will have to be on a non-school day. Dr. Avery notified.  Stephanie Shrestha LPN

## 2024-01-26 NOTE — PROGRESS NOTES
Care gaps include:  Hep B #2  Covid booster  Stephanie Shrestha LPN    One green top tube drawn from [facility] at 1630 and sent via tube system at 1640 via tube system. Stephanie Shrestha LPN'

## 2024-01-26 NOTE — PATIENT INSTRUCTIONS
We strongly recommend you obtain the following vaccine(s): Covid booster. You can obtain them at your local pharmacy or at your local health department. We have attached the vaccine information sheets to your after visit summary. If you decide to go to the health department, [facility] Department is at [address] [place] [address]. Their hours are 8:00 am - 4:30 pm, Monday through Friday. Their phone number is [phone].

## 2024-01-29 NOTE — PROGRESS NOTES
[facility] Phys[facility] - [facility] Internal Medicine      SUBJECTIVE:  Dionicio Calle (:  1975) is a 48 y.o. female here for evaluation of the following chief complaint(s):  Follow-up  Headaches - was managing with conservative therapy with massage and exercise. Continues to do massage every 2 weeks.  Muscle knots in shoulders and neck.  These massages help to control the headaches.     Lightheaded and cold - CBC/CMP and TSH all normal. Dionicio states that she dresses in multiple layers each day and is still cold. Eating ice again as well. Worried that this could reflect low iron levels. Dionicio states that in the past she had a positive tilt table test.    Will check Iron, TIBC, Iron sat and Ferritin level as this has been declining over the course of the last few checks of ferritin level.    Advised to wear compression stockings, increase fluid and increase salt intake.     Anxiety - there has been a lot of stress recently with sick parents, school and parenting. Dionicio feels that she is handling this well and does not feel that she needs medication at this time.     Continue to monitor.     Perioral dermatitis - Still present.     Will trial once daily metronidazole gel to the area to see if this provides improvement.     Review of Systems as above.     Current Outpatient Medications on File Prior to Visit   Medication Sig Dispense Refill    psyllium (METAMUCIL) 58.6 % packet Take 1 packet by mouth daily       No current facility-administered medications on file prior to visit.       OBJECTIVE:    VS:   Vitals:    24 1525   BP: (!) 143/94   Site: Left Upper Arm   Position: Sitting   Cuff Size: Medium Adult   Pulse: 68   Resp: 16   Temp: 97.6 °F (36.4 °C)   TempSrc: Temporal   SpO2: 95%   Weight: 66.2 kg (146 lb)   Height: 1.702 m (5' 7\")     Physical Exam   Lungs:  CTA B  Neck:   No carotid bruits appreciated B.   CVS:  +s1/s2 without m/g/r appreciated.    Abd:  + BS, NTND, No renal or

## 2024-01-30 ENCOUNTER — OFFICE VISIT (OUTPATIENT)
Dept: INTERNAL MEDICINE | Age: 49
End: 2024-01-30
Payer: COMMERCIAL

## 2024-01-30 VITALS
SYSTOLIC BLOOD PRESSURE: 143 MMHG | HEIGHT: 67 IN | TEMPERATURE: 97.6 F | RESPIRATION RATE: 16 BRPM | OXYGEN SATURATION: 95 % | WEIGHT: 146 LBS | HEART RATE: 68 BPM | DIASTOLIC BLOOD PRESSURE: 94 MMHG | BODY MASS INDEX: 22.91 KG/M2

## 2024-01-30 DIAGNOSIS — L71.0 PERIORAL DERMATITIS: ICD-10-CM

## 2024-01-30 DIAGNOSIS — R68.89 COLD INTOLERANCE: Primary | ICD-10-CM

## 2024-01-30 DIAGNOSIS — R68.89 COLD INTOLERANCE: ICD-10-CM

## 2024-01-30 LAB
FERRITIN: 13 NG/ML
IRON % SATURATION: 13 % (ref 15–50)
IRON: 44 UG/DL (ref 37–145)
TOTAL IRON BINDING CAPACITY: 348 UG/DL (ref 250–450)

## 2024-01-30 PROCEDURE — 99213 OFFICE O/P EST LOW 20 MIN: CPT | Performed by: INTERNAL MEDICINE

## 2024-01-30 PROCEDURE — 36415 COLL VENOUS BLD VENIPUNCTURE: CPT | Performed by: INTERNAL MEDICINE

## 2024-01-30 PROCEDURE — 90746 HEPB VACCINE 3 DOSE ADULT IM: CPT | Performed by: INTERNAL MEDICINE

## 2024-01-30 RX ORDER — METRONIDAZOLE 7.5 MG/G
GEL TOPICAL
Qty: 45 G | Refills: 1 | Status: SHIPPED | OUTPATIENT
Start: 2024-01-30

## 2024-01-30 ASSESSMENT — PATIENT HEALTH QUESTIONNAIRE - PHQ9
2. FEELING DOWN, DEPRESSED OR HOPELESS: 0
SUM OF ALL RESPONSES TO PHQ QUESTIONS 1-9: 0
1. LITTLE INTEREST OR PLEASURE IN DOING THINGS: 0
SUM OF ALL RESPONSES TO PHQ9 QUESTIONS 1 & 2: 0
SUM OF ALL RESPONSES TO PHQ QUESTIONS 1-9: 0
SUM OF ALL RESPONSES TO PHQ QUESTIONS 1-9: 0

## 2024-02-01 ENCOUNTER — TELEPHONE (OUTPATIENT)
Dept: INTERNAL MEDICINE | Age: 49
End: 2024-02-01

## 2024-02-01 DIAGNOSIS — D50.9 IRON DEFICIENCY ANEMIA, UNSPECIFIED IRON DEFICIENCY ANEMIA TYPE: Primary | ICD-10-CM

## 2024-02-01 NOTE — TELEPHONE ENCOUNTER
Dionicio called re: iron studies.  Results show low Fe, TIBC on the upper end of normal and low % saturation. Additionally, Ferritin level has fallen from previous to 13.  I have advised oral iron replacement at 325 mg twice daily and at least once daily if this causes constipation. Will need to recheck these parameters in one month. Orders for these placed on the chart.     Sandra Avery MD  2/1/2024

## 2024-03-09 ENCOUNTER — HOSPITAL ENCOUNTER (OUTPATIENT)
Age: 49
Discharge: HOME | End: 2024-03-09
Payer: COMMERCIAL

## 2024-03-09 DIAGNOSIS — D50.9 IRON DEFICIENCY ANEMIA, UNSPECIFIED IRON DEFICIENCY ANEMIA TYPE: ICD-10-CM

## 2024-03-09 LAB
FERRITIN SERPL-MCNC: 17 NG/ML
IMM RETICS NFR: 7 % (ref 3–15.9)
IRON SATN MFR SERPL: 21 % (ref 15–50)
IRON SERPL-MCNC: 74 UG/DL (ref 37–145)
RETIC HEMOGLOBIN: 31.4 PG (ref 28.2–36.6)
RETICS # AUTO: 0.05 M/UL
RETICS/RBC NFR AUTO: 1 % (ref 0.4–1.9)
TIBC SERPL-MCNC: 356 UG/DL (ref 250–450)

## 2024-03-09 PROCEDURE — 36415 COLL VENOUS BLD VENIPUNCTURE: CPT

## 2024-03-09 PROCEDURE — 82728 ASSAY OF FERRITIN: CPT

## 2024-03-09 PROCEDURE — 83540 ASSAY OF IRON: CPT

## 2024-03-09 PROCEDURE — 85045 AUTOMATED RETICULOCYTE COUNT: CPT

## 2024-03-09 PROCEDURE — 83550 IRON BINDING TEST: CPT

## 2024-03-14 ENCOUNTER — OFFICE VISIT (OUTPATIENT)
Dept: FAMILY MEDICINE CLINIC | Age: 49
End: 2024-03-14
Payer: COMMERCIAL

## 2024-03-14 VITALS
SYSTOLIC BLOOD PRESSURE: 124 MMHG | WEIGHT: 146 LBS | TEMPERATURE: 97.8 F | HEART RATE: 81 BPM | BODY MASS INDEX: 22.91 KG/M2 | DIASTOLIC BLOOD PRESSURE: 76 MMHG | OXYGEN SATURATION: 98 % | HEIGHT: 67 IN

## 2024-03-14 DIAGNOSIS — J01.90 ACUTE NON-RECURRENT SINUSITIS, UNSPECIFIED LOCATION: Primary | ICD-10-CM

## 2024-03-14 PROCEDURE — 99204 OFFICE O/P NEW MOD 45 MIN: CPT | Performed by: PHYSICIAN ASSISTANT

## 2024-03-14 PROCEDURE — 96372 THER/PROPH/DIAG INJ SC/IM: CPT | Performed by: PHYSICIAN ASSISTANT

## 2024-03-14 RX ORDER — DEXAMETHASONE SODIUM PHOSPHATE 10 MG/ML
10 INJECTION INTRAMUSCULAR; INTRAVENOUS ONCE
Status: COMPLETED | OUTPATIENT
Start: 2024-03-14 | End: 2024-03-14

## 2024-03-14 RX ORDER — DEXAMETHASONE 6 MG/1
6 TABLET ORAL 2 TIMES DAILY WITH MEALS
Qty: 20 TABLET | Refills: 0 | Status: SHIPPED | OUTPATIENT
Start: 2024-03-14 | End: 2024-03-24

## 2024-03-14 RX ORDER — DEXAMETHASONE SODIUM PHOSPHATE 10 MG/ML
10 INJECTION INTRAMUSCULAR; INTRAVENOUS ONCE
Status: DISCONTINUED | OUTPATIENT
Start: 2024-03-14 | End: 2024-03-14

## 2024-03-14 RX ORDER — AMOXICILLIN AND CLAVULANATE POTASSIUM 875; 125 MG/1; MG/1
1 TABLET, FILM COATED ORAL 2 TIMES DAILY
Qty: 20 TABLET | Refills: 0 | Status: SHIPPED | OUTPATIENT
Start: 2024-03-14 | End: 2024-03-24

## 2024-03-14 RX ADMIN — DEXAMETHASONE SODIUM PHOSPHATE 10 MG: 10 INJECTION INTRAMUSCULAR; INTRAVENOUS at 13:32

## 2024-03-14 NOTE — PROGRESS NOTES
3/14/24  Dionicio Calle : 1975 Sex: female  Age 48 y.o.      Subjective:  Chief Complaint   Patient presents with    Pressure Behind the Eyes     Started yesterday. Claritin and Advil no relief.    Sinus Problem         HPI:   HPI  Dionicio Calle , 48 y.o. female presents to express care for evaluation of sinus pressure, congestion, drainage.  The patient started with the symptoms yesterday.  The patient has had some increased congestion, drainage and pressure behind the right eye.  The patient has had some runny nose, congestion.  The patient is not having fevers, chills.  The patient has a history of septal and preseptal cellulitis.  The patient states that she really does not have any swelling to the face where she has had that in the past.  She started with the pinkeye before and that had led to the preseptal cellulitis.  The patient is not having any fevers, chills, no pain with movement of the eyes.        ROS:   Unless otherwise stated in this report the patient's positive and negative responses for review of systems for constitutional, eyes, ENT, cardiovascular, respiratory, gastrointestinal, neurological, , musculoskeletal, and integument systems and related systems to the presenting problem are either stated in the history of present illness or were not pertinent or were negative for the symptoms and/or complaints related to the presenting medical problem.  Positives and pertinent negatives as per HPI.  All others reviewed and are negative.      PMH:     Past Medical History:   Diagnosis Date    Celiac disease     Colitis     lymphcytic    Hypoparathyroidism (HCC)     Iron deficiency anemia     Dr. Yu--iron infusions years ago    Prolonged emergence from general anesthesia     Vitiligo        Past Surgical History:   Procedure Laterality Date    ABDOMINAL EXPLORATION SURGERY      Exploratory laparoscopy for endometriosis    COLONOSCOPY      h/o polyps    COLONOSCOPY N/A 2021

## 2024-03-19 ENCOUNTER — TELEPHONE (OUTPATIENT)
Dept: INTERNAL MEDICINE | Age: 49
End: 2024-03-19

## 2024-03-19 NOTE — TELEPHONE ENCOUNTER
----- Message from Sandra Avery MD sent at 3/13/2024 11:43 AM EDT -----  Please let Dionicio know that her iron levels look much better.  She should continue iron supplementation for now.    Sandra Avery MD  3/13/2024

## 2024-03-22 ENCOUNTER — HOSPITAL ENCOUNTER (OUTPATIENT)
Dept: CT IMAGING | Age: 49
Discharge: HOME | End: 2024-03-22
Attending: INTERNAL MEDICINE
Payer: COMMERCIAL

## 2024-03-22 ENCOUNTER — OFFICE VISIT (OUTPATIENT)
Dept: INTERNAL MEDICINE | Age: 49
End: 2024-03-22
Payer: COMMERCIAL

## 2024-03-22 VITALS
HEIGHT: 67 IN | BODY MASS INDEX: 23.57 KG/M2 | TEMPERATURE: 97.7 F | DIASTOLIC BLOOD PRESSURE: 84 MMHG | OXYGEN SATURATION: 98 % | WEIGHT: 150.2 LBS | HEART RATE: 84 BPM | RESPIRATION RATE: 20 BRPM | SYSTOLIC BLOOD PRESSURE: 120 MMHG

## 2024-03-22 DIAGNOSIS — H05.011 CELLULITIS OF RIGHT ORBITAL REGION: ICD-10-CM

## 2024-03-22 DIAGNOSIS — H05.011 CELLULITIS OF RIGHT ORBITAL REGION: Primary | ICD-10-CM

## 2024-03-22 LAB
ANION GAP SERPL CALCULATED.3IONS-SCNC: 9 MMOL/L (ref 7–16)
BUN BLDV-MCNC: 20 MG/DL (ref 6–20)
CALCIUM SERPL-MCNC: 9.1 MG/DL (ref 8.6–10.2)
CHLORIDE BLD-SCNC: 98 MMOL/L (ref 98–107)
CO2: 32 MMOL/L (ref 22–29)
CREAT SERPL-MCNC: 1 MG/DL (ref 0.5–1)
GFR SERPL CREATININE-BSD FRML MDRD: >60 ML/MIN/1.73M2
GLUCOSE BLD-MCNC: 86 MG/DL (ref 74–99)
POTASSIUM SERPL-SCNC: 4.2 MMOL/L (ref 3.5–5)
SODIUM BLD-SCNC: 139 MMOL/L (ref 132–146)

## 2024-03-22 PROCEDURE — 99212 OFFICE O/P EST SF 10 MIN: CPT | Performed by: INTERNAL MEDICINE

## 2024-03-22 PROCEDURE — 99214 OFFICE O/P EST MOD 30 MIN: CPT | Performed by: INTERNAL MEDICINE

## 2024-03-22 PROCEDURE — 70488 CT MAXILLOFACIAL W/O & W/DYE: CPT

## 2024-03-22 PROCEDURE — 36415 COLL VENOUS BLD VENIPUNCTURE: CPT | Performed by: INTERNAL MEDICINE

## 2024-03-22 PROCEDURE — 6360000004 HC RX CONTRAST MEDICATION: Performed by: RADIOLOGY

## 2024-03-22 RX ORDER — LIDOCAINE HYDROCHLORIDE 20 MG/ML
15 SOLUTION OROPHARYNGEAL
Qty: 100 ML | Refills: 1 | Status: SHIPPED | OUTPATIENT
Start: 2024-03-22

## 2024-03-22 RX ADMIN — IOPAMIDOL 75 ML: 755 INJECTION, SOLUTION INTRAVENOUS at 12:26

## 2024-03-22 NOTE — PATIENT INSTRUCTIONS
Miralax dosing is 17 g in 8 oz of fluid. You can take this twice daily.   Magnesium citrate - take 300 mL if no relief with Miralax.

## 2024-03-22 NOTE — PROGRESS NOTES
[facility] Physicians - [facility] Internal Medicine      SUBJECTIVE:  Dionicio Calle (:  1975) is a 48 y.o. female here for evaluation of the following chief complaint(s):  Eye Problem (Patient states she has eye swelling x 1 week. Patient was treated at Urgent care with steroids. ), Neck Pain (Patient complaining of pain in the base of her head into her neck. Pain started last night 7/10 pain), and Constipation (Patient complains of not having a bowel movement x 1 week since starting the steroid. Denies abdominal pain. )  Sinusitis - Still on Augmentin since Thursday.  Woke up on Thursday and was diagnosed with periorbital cellulitis.  Receoved shot of steroids and placed dexamethasone. Sinusitus/cellulitis improved.  These episodes started with a preseptal cellutlitis. Went to ophthalmology but has not been evaluated by ENT. + sweats requiring her to keep the window open.  Feels swollen all over.  Base of her neck hurts starting last night. 7/10 in severity.  Throat pain - feels like she has had her tonsils out. Eye feels foggy and cloudy.   Given recurrent preseptal cellulitus, this could represent fistula from maxillary sinus. Discussed with Dr. Manny Barron who is in agreement with CT scan of sinuses - per recommendation, will perform this CT with contrast.     Check CT scan of sinuses     If (-), will have patient seen by ENT.     Constipation x 1 week - + pressure feeling.   Reports that she is not having flatulence.    Miralax, 17 g twice daily   Magnesium citrate, 300 ml if no improvement.     Review of Systems as above.     Current Outpatient Medications on File Prior to Visit   Medication Sig Dispense Refill    amoxicillin-clavulanate (AUGMENTIN) 875-125 MG per tablet Take 1 tablet by mouth 2 times daily for 10 days 20 tablet 0    metroNIDAZOLE (METROGEL) 0.75 % gel Apply thin film topically one time daily. 45 g 1    psyllium (METAMUCIL) 58.6 % packet Take 1 packet by mouth

## 2024-03-22 NOTE — PROGRESS NOTES
Called For Stat Ct scan, pt scheduled at [place] at 1pm, pt to report at 1230pm, no eating or drinking. Pt notified  and stated understanding. Stat BMP drawn and sent to lab, One green Top sent. Pt tolerated well.

## 2024-04-10 ENCOUNTER — OFFICE VISIT (OUTPATIENT)
Dept: FAMILY MEDICINE CLINIC | Age: 49
End: 2024-04-10
Payer: COMMERCIAL

## 2024-04-10 VITALS
BODY MASS INDEX: 23.23 KG/M2 | WEIGHT: 148 LBS | SYSTOLIC BLOOD PRESSURE: 118 MMHG | OXYGEN SATURATION: 97 % | DIASTOLIC BLOOD PRESSURE: 76 MMHG | HEIGHT: 67 IN | TEMPERATURE: 97.8 F | HEART RATE: 113 BPM

## 2024-04-10 DIAGNOSIS — R11.2 NAUSEA VOMITING AND DIARRHEA: Primary | ICD-10-CM

## 2024-04-10 DIAGNOSIS — R19.7 NAUSEA VOMITING AND DIARRHEA: ICD-10-CM

## 2024-04-10 DIAGNOSIS — R11.2 NAUSEA VOMITING AND DIARRHEA: ICD-10-CM

## 2024-04-10 DIAGNOSIS — R19.7 NAUSEA VOMITING AND DIARRHEA: Primary | ICD-10-CM

## 2024-04-10 LAB
ALBUMIN SERPL-MCNC: 4.1 G/DL (ref 3.5–5.2)
ALP BLD-CCNC: 58 U/L (ref 35–104)
ALT SERPL-CCNC: 15 U/L (ref 0–32)
ANION GAP SERPL CALCULATED.3IONS-SCNC: 16 MMOL/L (ref 7–16)
AST SERPL-CCNC: 18 U/L (ref 0–31)
BASOPHILS ABSOLUTE: 0.02 K/UL (ref 0–0.2)
BASOPHILS RELATIVE PERCENT: 0 % (ref 0–2)
BILIRUB SERPL-MCNC: 1 MG/DL (ref 0–1.2)
BUN BLDV-MCNC: 10 MG/DL (ref 6–20)
CALCIUM SERPL-MCNC: 8.9 MG/DL (ref 8.6–10.2)
CHLORIDE BLD-SCNC: 101 MMOL/L (ref 98–107)
CO2: 21 MMOL/L (ref 22–29)
CREAT SERPL-MCNC: 0.9 MG/DL (ref 0.5–1)
EOSINOPHILS ABSOLUTE: 0.04 K/UL (ref 0.05–0.5)
EOSINOPHILS RELATIVE PERCENT: 1 % (ref 0–6)
GFR SERPL CREATININE-BSD FRML MDRD: 77 ML/MIN/1.73M2
GLUCOSE BLD-MCNC: 114 MG/DL (ref 74–99)
HCT VFR BLD CALC: 43 % (ref 34–48)
HEMOGLOBIN: 14 G/DL (ref 11.5–15.5)
IMMATURE GRANULOCYTES %: 0 % (ref 0–5)
IMMATURE GRANULOCYTES ABSOLUTE: <0.03 K/UL (ref 0–0.58)
INFLUENZA A ANTIBODY: NORMAL
INFLUENZA B ANTIBODY: NORMAL
LIPASE: 46 U/L (ref 13–60)
LYMPHOCYTES ABSOLUTE: 0.41 K/UL (ref 1.5–4)
LYMPHOCYTES RELATIVE PERCENT: 6 % (ref 20–42)
MAGNESIUM: 1.5 MG/DL (ref 1.6–2.6)
MCH RBC QN AUTO: 28 PG (ref 26–35)
MCHC RBC AUTO-ENTMCNC: 32.6 G/DL (ref 32–34.5)
MCV RBC AUTO: 86 FL (ref 80–99.9)
MONOCYTES ABSOLUTE: 0.37 K/UL (ref 0.1–0.95)
MONOCYTES RELATIVE PERCENT: 6 % (ref 2–12)
NEUTROPHILS ABSOLUTE: 5.77 K/UL (ref 1.8–7.3)
NEUTROPHILS RELATIVE PERCENT: 87 % (ref 43–80)
PDW BLD-RTO: 14.2 % (ref 11.5–15)
PLATELET # BLD: 274 K/UL (ref 130–450)
PMV BLD AUTO: 10.8 FL (ref 7–12)
POTASSIUM SERPL-SCNC: 4 MMOL/L (ref 3.5–5)
RBC # BLD: 5 M/UL (ref 3.5–5.5)
RBC # BLD: ABNORMAL 10*6/UL
SODIUM BLD-SCNC: 138 MMOL/L (ref 132–146)
TOTAL PROTEIN: 6.8 G/DL (ref 6.4–8.3)
WBC # BLD: 6.6 K/UL (ref 4.5–11.5)

## 2024-04-10 PROCEDURE — 99214 OFFICE O/P EST MOD 30 MIN: CPT | Performed by: PHYSICIAN ASSISTANT

## 2024-04-10 PROCEDURE — 87804 INFLUENZA ASSAY W/OPTIC: CPT | Performed by: PHYSICIAN ASSISTANT

## 2024-04-10 RX ORDER — ONDANSETRON 4 MG/1
4 TABLET, ORALLY DISINTEGRATING ORAL 3 TIMES DAILY PRN
Qty: 21 TABLET | Refills: 0 | Status: SHIPPED | OUTPATIENT
Start: 2024-04-10

## 2024-04-10 RX ORDER — DICYCLOMINE HCL 20 MG
20 TABLET ORAL 4 TIMES DAILY PRN
Qty: 16 TABLET | Refills: 0 | Status: SHIPPED | OUTPATIENT
Start: 2024-04-10

## 2024-04-10 NOTE — PROGRESS NOTES
Use    Smoking status: Never    Smokeless tobacco: Never   Vaping Use    Vaping Use: Never used   Substance Use Topics    Alcohol use: No    Drug use: No       Patient lives at home.    Physical Exam:     Vitals:    04/10/24 1610   BP: 118/76   Pulse: (!) 113   Temp: 97.8 °F (36.6 °C)   SpO2: 97%   Weight: 67.1 kg (148 lb)   Height: 1.702 m (5' 7.01\")       Exam:  Physical Exam  Nurses note and vital signs reviewed and patient is not hypoxic.    General: The patient appears well and in no apparent distress. Patient is resting comfortably on cart.  Skin: Warm, dry, no pallor noted. There is no rash noted.  Head: Normocephalic, atraumatic.  Eye: Normal conjunctiva  Ears, Nose, Mouth, and Throat: Oral mucosa is moist  Cardiovascular: Regular tachycardia  Respiratory: Patient is in no distress, no accessory muscle use, lungs are clear to auscultation, no wheezing, crackles or rhonchi  Back: Non-tender, no CVA tenderness bilaterally to percussion.  GI: Normal bowel sounds, no tenderness to palpation, no masses appreciated. No rebound, guarding, or rigidity noted.  Musculoskeletal: The patient has no evidence of calf tenderness, no pitting edema, symmetrical pulses noted bilaterally  Neurological: A&O x4, normal speech        Testing:     Results for orders placed or performed in visit on 04/10/24   POCT Influenza A/B   Result Value Ref Range    Influenza A Ab neg     Influenza B Ab neg            Medical Decision Making:     Vital signs reviewed    Past medical history reviewed.    Allergies reviewed.    Medications reviewed.    Patient on arrival does not appear to be in any apparent distress or discomfort.  The patient has been seen and evaluated.  The patient does not appear to be toxic or lethargic.     The patient does not appear to be toxic lethargic but does appear that she does not feel well.    The patient had influenza obtained that was negative.    Did discuss that this may be related to GI flu and not

## 2024-04-11 NOTE — RESULT ENCOUNTER NOTE
Laboratory studies showed a normal white blood cell count at 6.6.  Hemoglobin hematocrit are stable.  Platelet count was normal.  The patient had a slightly low magnesium at 1.5.  1.6 is normal low value.  The patient's CMP was within normal limits other than a bicarb that was low at 21 which may be a reflection of hydration status.  Liver enzymes are normal.  Kidney function normal.    Please have the patient continue to hydrate.  Follow-up with PCP for repeat labs if not improving or not able to tolerate fluids go directly to the ED

## 2024-07-10 DIAGNOSIS — D50.9 IRON DEFICIENCY ANEMIA, UNSPECIFIED IRON DEFICIENCY ANEMIA TYPE: Primary | ICD-10-CM

## 2024-07-10 NOTE — PROGRESS NOTES
Repeat iron studies ordered. Please notify patient.  Dionicio needs to reschedule her office visit which was originally scheduled for 7/31/2024.     Sandra Avery MD  7/10/2024

## 2024-07-12 ENCOUNTER — HOSPITAL ENCOUNTER (OUTPATIENT)
Age: 49
Discharge: HOME | End: 2024-07-12
Payer: COMMERCIAL

## 2024-07-12 DIAGNOSIS — D50.9 IRON DEFICIENCY ANEMIA, UNSPECIFIED IRON DEFICIENCY ANEMIA TYPE: ICD-10-CM

## 2024-07-12 LAB
FERRITIN SERPL-MCNC: 11 NG/ML
IRON SATN MFR SERPL: 17 % (ref 15–50)
IRON SERPL-MCNC: 61 UG/DL (ref 37–145)
TIBC SERPL-MCNC: 352 UG/DL (ref 250–450)

## 2024-07-12 PROCEDURE — 83550 IRON BINDING TEST: CPT

## 2024-07-12 PROCEDURE — 36415 COLL VENOUS BLD VENIPUNCTURE: CPT

## 2024-07-12 PROCEDURE — 82728 ASSAY OF FERRITIN: CPT

## 2024-07-12 PROCEDURE — 83540 ASSAY OF IRON: CPT

## 2024-07-22 ENCOUNTER — NURSE ONLY (OUTPATIENT)
Dept: INTERNAL MEDICINE | Age: 49
End: 2024-07-22
Payer: COMMERCIAL

## 2024-07-22 VITALS — TEMPERATURE: 97.5 F

## 2024-07-22 DIAGNOSIS — Z23 NEED FOR HEPATITIS B VACCINATION: Primary | ICD-10-CM

## 2024-07-22 PROCEDURE — 90746 HEPB VACCINE 3 DOSE ADULT IM: CPT

## 2024-08-02 ENCOUNTER — HOSPITAL ENCOUNTER (OUTPATIENT)
Age: 49
Discharge: HOME | End: 2024-08-04

## 2024-08-02 PROCEDURE — 86803 HEPATITIS C AB TEST: CPT

## 2024-08-02 PROCEDURE — 86317 IMMUNOASSAY INFECTIOUS AGENT: CPT

## 2024-08-02 PROCEDURE — 87340 HEPATITIS B SURFACE AG IA: CPT

## 2024-08-02 PROCEDURE — 87389 HIV-1 AG W/HIV-1&-2 AB AG IA: CPT

## 2024-08-05 LAB
HBV SURFACE AB SERPL IA-ACNC: >1000 MIU/ML (ref 0–9.99)
HBV SURFACE AG SERPL QL IA: NONREACTIVE
HCV AB SERPL QL IA: NONREACTIVE
HIV 1+2 AB+HIV1 P24 AG SERPL QL IA: NONREACTIVE

## 2024-08-16 ENCOUNTER — HOSPITAL ENCOUNTER (OUTPATIENT)
Age: 49
Discharge: HOME | End: 2024-08-16
Payer: COMMERCIAL

## 2024-08-16 DIAGNOSIS — R19.7 WATERY DIARRHEA: Primary | ICD-10-CM

## 2024-08-16 DIAGNOSIS — R19.7 WATERY DIARRHEA: ICD-10-CM

## 2024-08-16 LAB
ALBUMIN SERPL-MCNC: 4.1 G/DL (ref 3.5–5.2)
ALP SERPL-CCNC: 52 U/L (ref 35–104)
ALT SERPL-CCNC: 13 U/L (ref 0–32)
ANION GAP SERPL CALCULATED.3IONS-SCNC: 8 MMOL/L (ref 7–16)
AST SERPL-CCNC: 22 U/L (ref 0–31)
BASOPHILS # BLD: 0.04 K/UL (ref 0–0.2)
BASOPHILS NFR BLD: 1 % (ref 0–2)
BILIRUB SERPL-MCNC: 0.6 MG/DL (ref 0–1.2)
BUN SERPL-MCNC: 10 MG/DL (ref 6–20)
CALCIUM SERPL-MCNC: 9.3 MG/DL (ref 8.6–10.2)
CHLORIDE SERPL-SCNC: 103 MMOL/L (ref 98–107)
CO2 SERPL-SCNC: 27 MMOL/L (ref 22–29)
CREAT SERPL-MCNC: 0.8 MG/DL (ref 0.5–1)
EOSINOPHIL # BLD: 0.04 K/UL (ref 0.05–0.5)
EOSINOPHILS RELATIVE PERCENT: 1 % (ref 0–6)
ERYTHROCYTE [DISTWIDTH] IN BLOOD BY AUTOMATED COUNT: 14.3 % (ref 11.5–15)
GFR, ESTIMATED: 88 ML/MIN/1.73M2
GLUCOSE SERPL-MCNC: 81 MG/DL (ref 74–99)
HCT VFR BLD AUTO: 39.6 % (ref 34–48)
HGB BLD-MCNC: 12.3 G/DL (ref 11.5–15.5)
IMM GRANULOCYTES # BLD AUTO: 0.03 K/UL (ref 0–0.58)
IMM GRANULOCYTES NFR BLD: 0 % (ref 0–5)
LYMPHOCYTES NFR BLD: 2.31 K/UL (ref 1.5–4)
LYMPHOCYTES RELATIVE PERCENT: 26 % (ref 20–42)
MCH RBC QN AUTO: 27 PG (ref 26–35)
MCHC RBC AUTO-ENTMCNC: 31.1 G/DL (ref 32–34.5)
MCV RBC AUTO: 86.8 FL (ref 80–99.9)
MONOCYTES NFR BLD: 0.83 K/UL (ref 0.1–0.95)
MONOCYTES NFR BLD: 10 % (ref 2–12)
NEUTROPHILS NFR BLD: 63 % (ref 43–80)
NEUTS SEG NFR BLD: 5.5 K/UL (ref 1.8–7.3)
PLATELET # BLD AUTO: 340 K/UL (ref 130–450)
PMV BLD AUTO: 10.8 FL (ref 7–12)
POTASSIUM SERPL-SCNC: 4.1 MMOL/L (ref 3.5–5)
PROT SERPL-MCNC: 6.8 G/DL (ref 6.4–8.3)
RBC # BLD AUTO: 4.56 M/UL (ref 3.5–5.5)
SODIUM SERPL-SCNC: 138 MMOL/L (ref 132–146)
WBC OTHER # BLD: 8.8 K/UL (ref 4.5–11.5)

## 2024-08-16 PROCEDURE — 80053 COMPREHEN METABOLIC PANEL: CPT

## 2024-08-16 PROCEDURE — 85025 COMPLETE CBC W/AUTO DIFF WBC: CPT

## 2024-08-16 PROCEDURE — 36415 COLL VENOUS BLD VENIPUNCTURE: CPT

## 2024-08-16 NOTE — PROGRESS NOTES
Dionicio reports that she is having multiple episodes of foul smelling stool over the course of the past few days. Will check c.dif, CBC and CMP.      Sandra Avery MD  8/16/2024

## 2024-08-17 ENCOUNTER — HOSPITAL ENCOUNTER (OUTPATIENT)
Age: 49
Discharge: HOME | End: 2024-08-17
Payer: COMMERCIAL

## 2024-08-17 DIAGNOSIS — R19.7 WATERY DIARRHEA: ICD-10-CM

## 2024-08-17 PROCEDURE — 87324 CLOSTRIDIUM AG IA: CPT

## 2024-08-17 PROCEDURE — 87449 NOS EACH ORGANISM AG IA: CPT

## 2024-08-18 LAB
C DIFF GDH + TOXINS A+B STL QL IA.RAPID: NEGATIVE
SPECIMEN DESCRIPTION: NORMAL

## 2024-12-19 ENCOUNTER — OFFICE VISIT (OUTPATIENT)
Dept: FAMILY MEDICINE CLINIC | Age: 49
End: 2024-12-19
Payer: COMMERCIAL

## 2024-12-19 VITALS
RESPIRATION RATE: 20 BRPM | HEART RATE: 92 BPM | BODY MASS INDEX: 23.86 KG/M2 | SYSTOLIC BLOOD PRESSURE: 122 MMHG | WEIGHT: 152 LBS | DIASTOLIC BLOOD PRESSURE: 82 MMHG | HEIGHT: 67 IN | TEMPERATURE: 97.9 F | OXYGEN SATURATION: 99 %

## 2024-12-19 DIAGNOSIS — R05.8 DRY COUGH: ICD-10-CM

## 2024-12-19 DIAGNOSIS — R51.9 SINUS HEADACHE: ICD-10-CM

## 2024-12-19 DIAGNOSIS — J01.40 ACUTE NON-RECURRENT PANSINUSITIS: Primary | ICD-10-CM

## 2024-12-19 PROCEDURE — G8420 CALC BMI NORM PARAMETERS: HCPCS | Performed by: EMERGENCY MEDICINE

## 2024-12-19 PROCEDURE — 99213 OFFICE O/P EST LOW 20 MIN: CPT | Performed by: EMERGENCY MEDICINE

## 2024-12-19 PROCEDURE — 1036F TOBACCO NON-USER: CPT | Performed by: EMERGENCY MEDICINE

## 2024-12-19 PROCEDURE — G8427 DOCREV CUR MEDS BY ELIG CLIN: HCPCS | Performed by: EMERGENCY MEDICINE

## 2024-12-19 PROCEDURE — G8484 FLU IMMUNIZE NO ADMIN: HCPCS | Performed by: EMERGENCY MEDICINE

## 2024-12-19 RX ORDER — BENZONATATE 200 MG/1
200 CAPSULE ORAL 3 TIMES DAILY PRN
Qty: 21 CAPSULE | Refills: 0 | Status: SHIPPED | OUTPATIENT
Start: 2024-12-19

## 2024-12-19 RX ORDER — GUAIFENESIN 600 MG/1
600 TABLET, EXTENDED RELEASE ORAL 2 TIMES DAILY
Qty: 30 TABLET | Refills: 0 | Status: SHIPPED | OUTPATIENT
Start: 2024-12-19 | End: 2025-01-03

## 2024-12-19 RX ORDER — CEFDINIR 300 MG/1
300 CAPSULE ORAL 2 TIMES DAILY
Qty: 20 CAPSULE | Refills: 0 | Status: SHIPPED | OUTPATIENT
Start: 2024-12-19 | End: 2024-12-29

## 2024-12-19 ASSESSMENT — ENCOUNTER SYMPTOMS
EYE REDNESS: 0
ABDOMINAL DISTENTION: 0
EYE PAIN: 0
DIARRHEA: 0
COUGH: 1
VOMITING: 0
BACK PAIN: 0
NAUSEA: 0
SINUS PRESSURE: 1
WHEEZING: 0
EYE DISCHARGE: 0
SORE THROAT: 0
SHORTNESS OF BREATH: 0

## 2024-12-19 NOTE — PROGRESS NOTES
Chief Complaint:   Congestion and Headache      History of Present Illness   HPI:  Dionicio Calle is a 49 y.o. female who presents to [facility] today for sinus headache, dry cough, sinus congestion    Prior to Visit Medications    Medication Sig Taking? Authorizing Provider   cefdinir (OMNICEF) 300 MG capsule Take 1 capsule by mouth 2 times daily for 10 days Yes Pancho Boateng, DO   benzonatate (TESSALON) 200 MG capsule Take 1 capsule by mouth 3 times daily as needed for Cough Yes Pancho Boateng, DO   guaiFENesin (MUCINEX) 600 MG extended release tablet Take 1 tablet by mouth 2 times daily for 15 days Yes Pancho Boateng, DO   ondansetron (ZOFRAN-ODT) 4 MG disintegrating tablet Take 1 tablet by mouth 3 times daily as needed for Nausea or Vomiting  Patient not taking: Reported on 7/22/2024  Marvin Guevara III, PA   dicyclomine (BENTYL) 20 MG tablet Take 1 tablet by mouth 4 times daily as needed (abdominal pain/cramping/diarhea)  Patient not taking: Reported on 7/22/2024  Marvin Guevara III, PA   lidocaine viscous hcl (XYLOCAINE) 2 % SOLN solution Take 15 mLs by mouth every 3 hours as needed for Irritation  Patient not taking: Reported on 7/22/2024  Sandra Avery MD   metroNIDAZOLE (METROGEL) 0.75 % gel Apply thin film topically one time daily.  Patient not taking: Reported on 7/22/2024  Sandra Avery MD   psyllium (METAMUCIL) 58.6 % packet Take 1 packet by mouth daily  Patient not taking: Reported on 7/22/2024  ProviderRosalia MD       Review of Systems   Review of Systems   Constitutional:  Negative for chills and fever.   HENT:  Positive for congestion and sinus pressure. Negative for ear pain and sore throat.    Eyes:  Negative for pain, discharge and redness.   Respiratory:  Positive for cough. Negative for shortness of breath and wheezing.    Cardiovascular:  Negative for chest pain.   Gastrointestinal:  Negative for abdominal distention, diarrhea, nausea and vomiting.   Genitourinary:

## 2024-12-26 LAB
ANTIBODY: >1000
CHOLESTEROL, TOTAL: 189 MG/DL
CHOLESTEROL/HDL RATIO: 3
ESTIMATED AVERAGE GLUCOSE: NORMAL
HBA1C MFR BLD: 5.4 %
HDLC SERPL-MCNC: 64 MG/DL (ref 35–70)
LDL CHOLESTEROL DIRECT: ABNORMAL
LDL CHOLESTEROL: 109
QUANTI TB GOLD PLUS: NEGATIVE
QUANTI TB1 MINUS NIL: 0.01
QUANTI TB2 MINUS NIL: 0.01
QUANTIFERON MITOGEN: 7.76
QUANTIFERON NIL: 0.02
TRIGL SERPL-MCNC: 75 MG/DL
VLDLC SERPL CALC-MCNC: ABNORMAL MG/DL

## 2025-01-13 ENCOUNTER — OFFICE VISIT (OUTPATIENT)
Dept: FAMILY MEDICINE CLINIC | Age: 50
End: 2025-01-13
Payer: COMMERCIAL

## 2025-01-13 VITALS
BODY MASS INDEX: 24.43 KG/M2 | WEIGHT: 156 LBS | OXYGEN SATURATION: 99 % | RESPIRATION RATE: 18 BRPM | HEART RATE: 62 BPM | SYSTOLIC BLOOD PRESSURE: 136 MMHG | DIASTOLIC BLOOD PRESSURE: 84 MMHG | TEMPERATURE: 97.5 F

## 2025-01-13 DIAGNOSIS — J02.9 SORE THROAT: Primary | ICD-10-CM

## 2025-01-13 DIAGNOSIS — R09.82 POSTNASAL DRIP: ICD-10-CM

## 2025-01-13 DIAGNOSIS — J01.90 ACUTE NON-RECURRENT SINUSITIS, UNSPECIFIED LOCATION: ICD-10-CM

## 2025-01-13 DIAGNOSIS — R09.81 NASAL CONGESTION: ICD-10-CM

## 2025-01-13 LAB — S PYO AG THROAT QL: NORMAL

## 2025-01-13 PROCEDURE — 87880 STREP A ASSAY W/OPTIC: CPT | Performed by: PHYSICIAN ASSISTANT

## 2025-01-13 PROCEDURE — 99213 OFFICE O/P EST LOW 20 MIN: CPT | Performed by: PHYSICIAN ASSISTANT

## 2025-01-13 PROCEDURE — 1036F TOBACCO NON-USER: CPT | Performed by: PHYSICIAN ASSISTANT

## 2025-01-13 PROCEDURE — G8420 CALC BMI NORM PARAMETERS: HCPCS | Performed by: PHYSICIAN ASSISTANT

## 2025-01-13 PROCEDURE — G8427 DOCREV CUR MEDS BY ELIG CLIN: HCPCS | Performed by: PHYSICIAN ASSISTANT

## 2025-01-13 RX ORDER — AMOXICILLIN 875 MG/1
875 TABLET, COATED ORAL 2 TIMES DAILY
Qty: 20 TABLET | Refills: 0 | Status: SHIPPED | OUTPATIENT
Start: 2025-01-13 | End: 2025-01-23

## 2025-01-13 NOTE — PROGRESS NOTES
discharged home.      Clinical Impression:   Dionicio was seen today for pharyngitis, ear pain and nasal congestion.    Diagnoses and all orders for this visit:    Sore throat  -     POCT rapid strep A    Acute non-recurrent sinusitis, unspecified location    Nasal congestion    Postnasal drip    Other orders  -     amoxicillin (AMOXIL) 875 MG tablet; Take 1 tablet by mouth 2 times daily for 10 days        The patient is to call for any concerns or return if any of the signs or symptoms worsen. The patient is to follow-up with PCP in the next 2-3 days for repeat evaluation repeat assessment or go directly to the emergency department.     SIGNATURE: Marvin Guevara III, PA-C    This document may have been prepared at least partially through the use of voice recognition software. Although effort is taken to assure the accuracy ofthis document, it is possible that grammatical, syntax, or spelling errors may occur.     **This report was transcribed using voice recognition software. The patient (or guardian, if applicable) and other individuals in attendance with the patient were advised that if Artificial Intelligence would be utilized during this visit to record and process the conversation to generate a clinical note they would be informed prior to start of the visit. The patient (or guardian, if applicable) and other individuals in attendance at the appointment consented to the use of AI if was utilized, including the recording.  Every effort was made to ensure accuracy; however, inadvertent computerized transcription errors may be present.

## 2025-01-16 ENCOUNTER — TELEPHONE (OUTPATIENT)
Dept: PRIMARY CARE CLINIC | Age: 50
End: 2025-01-16

## 2025-01-16 RX ORDER — CEFUROXIME AXETIL 500 MG/1
500 TABLET ORAL 2 TIMES DAILY
Qty: 14 TABLET | Refills: 0 | Status: SHIPPED | OUTPATIENT
Start: 2025-01-16 | End: 2025-01-23

## 2025-01-16 NOTE — TELEPHONE ENCOUNTER
Patient calling was seen Monday 1/13 started amoxicillin. States her symptoms have not changed sore throat, congestion, sinus drainage, ear pressure. Asking if you could change antibiotic.

## 2025-03-26 NOTE — TELEPHONE ENCOUNTER
Late entry:  Dionicio treated for influenza on this day.  Tamiflu sent to pharmacy.     Sandra Avery MD  3/26/2025

## 2025-04-01 DIAGNOSIS — Z12.31 ENCOUNTER FOR SCREENING MAMMOGRAM FOR MALIGNANT NEOPLASM OF BREAST: Primary | ICD-10-CM

## 2025-04-23 ENCOUNTER — PATIENT MESSAGE (OUTPATIENT)
Dept: INTERNAL MEDICINE | Age: 50
End: 2025-04-23

## 2025-04-29 ENCOUNTER — OFFICE VISIT (OUTPATIENT)
Dept: INTERNAL MEDICINE | Age: 50
End: 2025-04-29
Payer: COMMERCIAL

## 2025-04-29 VITALS
RESPIRATION RATE: 18 BRPM | DIASTOLIC BLOOD PRESSURE: 93 MMHG | OXYGEN SATURATION: 98 % | SYSTOLIC BLOOD PRESSURE: 144 MMHG | WEIGHT: 151 LBS | HEART RATE: 99 BPM | BODY MASS INDEX: 23.7 KG/M2 | TEMPERATURE: 97.4 F | HEIGHT: 67 IN

## 2025-04-29 DIAGNOSIS — R68.89 COLD INTOLERANCE: ICD-10-CM

## 2025-04-29 DIAGNOSIS — D50.9 IRON DEFICIENCY ANEMIA, UNSPECIFIED IRON DEFICIENCY ANEMIA TYPE: ICD-10-CM

## 2025-04-29 DIAGNOSIS — H02.401 PTOSIS OF RIGHT EYELID: ICD-10-CM

## 2025-04-29 DIAGNOSIS — H02.401 PTOSIS OF RIGHT EYELID: Primary | ICD-10-CM

## 2025-04-29 LAB
ALBUMIN: 4.3 G/DL (ref 3.5–5.2)
ALP BLD-CCNC: 61 U/L (ref 35–104)
ALT SERPL-CCNC: 17 U/L (ref 0–35)
ANION GAP SERPL CALCULATED.3IONS-SCNC: 9 MMOL/L (ref 7–16)
AST SERPL-CCNC: 26 U/L (ref 0–35)
BASOPHILS ABSOLUTE: 0.05 K/UL (ref 0–0.2)
BASOPHILS RELATIVE PERCENT: 1 % (ref 0–2)
BILIRUB SERPL-MCNC: 0.7 MG/DL (ref 0–1.2)
BUN BLDV-MCNC: 7 MG/DL (ref 6–20)
CALCIUM SERPL-MCNC: 9.7 MG/DL (ref 8.6–10)
CHLORIDE BLD-SCNC: 104 MMOL/L (ref 98–107)
CO2: 26 MMOL/L (ref 22–29)
CREAT SERPL-MCNC: 0.9 MG/DL (ref 0.5–1)
EOSINOPHILS ABSOLUTE: 0.11 K/UL (ref 0.05–0.5)
EOSINOPHILS RELATIVE PERCENT: 2 % (ref 0–6)
FERRITIN: 14 NG/ML
GFR, ESTIMATED: 75 ML/MIN/1.73M2
GLUCOSE BLD-MCNC: 97 MG/DL (ref 74–99)
HCT VFR BLD CALC: 43.5 % (ref 34–48)
HEMOGLOBIN: 14.2 G/DL (ref 11.5–15.5)
IMMATURE GRANULOCYTES %: 0 % (ref 0–5)
IMMATURE GRANULOCYTES ABSOLUTE: <0.03 K/UL (ref 0–0.58)
IRON % SATURATION: 27 % (ref 15–50)
IRON: 92 UG/DL (ref 37–145)
LYMPHOCYTES ABSOLUTE: 1.66 K/UL (ref 1.5–4)
LYMPHOCYTES RELATIVE PERCENT: 24 % (ref 20–42)
MCH RBC QN AUTO: 27.4 PG (ref 26–35)
MCHC RBC AUTO-ENTMCNC: 32.6 G/DL (ref 32–34.5)
MCV RBC AUTO: 84 FL (ref 80–99.9)
MONOCYTES ABSOLUTE: 0.45 K/UL (ref 0.1–0.95)
MONOCYTES RELATIVE PERCENT: 7 % (ref 2–12)
NEUTROPHILS ABSOLUTE: 4.65 K/UL (ref 1.8–7.3)
NEUTROPHILS RELATIVE PERCENT: 67 % (ref 43–80)
PDW BLD-RTO: 13 % (ref 11.5–15)
PLATELET # BLD: 340 K/UL (ref 130–450)
PMV BLD AUTO: 10.4 FL (ref 7–12)
POTASSIUM SERPL-SCNC: 4.7 MMOL/L (ref 3.5–5.1)
RBC # BLD: 5.18 M/UL (ref 3.5–5.5)
SODIUM BLD-SCNC: 139 MMOL/L (ref 136–145)
T4 FREE: 1.6 NG/DL (ref 0.9–1.7)
TOTAL IRON BINDING CAPACITY: 345 UG/DL (ref 250–450)
TOTAL PROTEIN: 7.1 G/DL (ref 6.4–8.3)
TSH SERPL DL<=0.05 MIU/L-ACNC: 0.06 UIU/ML (ref 0.27–4.2)
WBC # BLD: 6.9 K/UL (ref 4.5–11.5)

## 2025-04-29 PROCEDURE — 99214 OFFICE O/P EST MOD 30 MIN: CPT | Performed by: INTERNAL MEDICINE

## 2025-04-29 PROCEDURE — 1036F TOBACCO NON-USER: CPT | Performed by: INTERNAL MEDICINE

## 2025-04-29 PROCEDURE — G8420 CALC BMI NORM PARAMETERS: HCPCS | Performed by: INTERNAL MEDICINE

## 2025-04-29 PROCEDURE — 36415 COLL VENOUS BLD VENIPUNCTURE: CPT | Performed by: INTERNAL MEDICINE

## 2025-04-29 PROCEDURE — G8427 DOCREV CUR MEDS BY ELIG CLIN: HCPCS | Performed by: INTERNAL MEDICINE

## 2025-04-29 SDOH — ECONOMIC STABILITY: FOOD INSECURITY: HOW HARD IS IT FOR YOU TO PAY FOR THE VERY BASICS LIKE FOOD, HOUSING, MEDICAL CARE, AND HEATING?: NOT HARD AT ALL

## 2025-04-29 SDOH — ECONOMIC STABILITY: HOUSING INSECURITY: IN THE LAST 12 MONTHS, WAS THERE A TIME WHEN YOU WERE NOT ABLE TO PAY THE MORTGAGE OR RENT ON TIME?: NO

## 2025-04-29 SDOH — ECONOMIC STABILITY: FOOD INSECURITY: WITHIN THE PAST 12 MONTHS, YOU WORRIED THAT YOUR FOOD WOULD RUN OUT BEFORE YOU GOT THE MONEY TO BUY MORE.: NEVER TRUE

## 2025-04-29 SDOH — ECONOMIC STABILITY: FOOD INSECURITY: WITHIN THE PAST 12 MONTHS, THE FOOD YOU BOUGHT JUST DIDN'T LAST AND YOU DIDN'T HAVE MONEY TO GET MORE.: NEVER TRUE

## 2025-04-29 SDOH — ECONOMIC STABILITY: TRANSPORTATION INSECURITY: IN THE PAST 12 MONTHS, HAS LACK OF TRANSPORTATION KEPT YOU FROM MEDICAL APPOINTMENTS OR FROM GETTING MEDICATIONS?: NO

## 2025-04-29 ASSESSMENT — PATIENT HEALTH QUESTIONNAIRE - PHQ9
8. MOVING OR SPEAKING SO SLOWLY THAT OTHER PEOPLE COULD HAVE NOTICED. OR THE OPPOSITE, BEING SO FIGETY OR RESTLESS THAT YOU HAVE BEEN MOVING AROUND A LOT MORE THAN USUAL: NOT AT ALL
6. FEELING BAD ABOUT YOURSELF - OR THAT YOU ARE A FAILURE OR HAVE LET YOURSELF OR YOUR FAMILY DOWN: NOT AT ALL
9. THOUGHTS THAT YOU WOULD BE BETTER OFF DEAD, OR OF HURTING YOURSELF: NOT AT ALL
3. TROUBLE FALLING OR STAYING ASLEEP: NOT AT ALL
5. POOR APPETITE OR OVEREATING: NOT AT ALL
7. TROUBLE CONCENTRATING ON THINGS, SUCH AS READING THE NEWSPAPER OR WATCHING TELEVISION: NOT AT ALL
10. IF YOU CHECKED OFF ANY PROBLEMS, HOW DIFFICULT HAVE THESE PROBLEMS MADE IT FOR YOU TO DO YOUR WORK, TAKE CARE OF THINGS AT HOME, OR GET ALONG WITH OTHER PEOPLE: NOT DIFFICULT AT ALL
2. FEELING DOWN, DEPRESSED OR HOPELESS: SEVERAL DAYS
SUM OF ALL RESPONSES TO PHQ QUESTIONS 1-9: 2
4. FEELING TIRED OR HAVING LITTLE ENERGY: NOT AT ALL
SUM OF ALL RESPONSES TO PHQ QUESTIONS 1-9: 2
SUM OF ALL RESPONSES TO PHQ QUESTIONS 1-9: 2
1. LITTLE INTEREST OR PLEASURE IN DOING THINGS: SEVERAL DAYS
SUM OF ALL RESPONSES TO PHQ QUESTIONS 1-9: 2

## 2025-04-29 ASSESSMENT — ACTIVITIES OF DAILY LIVING (ADL): LACK_OF_TRANSPORTATION: NO

## 2025-04-29 ASSESSMENT — LIFESTYLE VARIABLES
HOW OFTEN DO YOU HAVE A DRINK CONTAINING ALCOHOL: NEVER
HOW MANY STANDARD DRINKS CONTAINING ALCOHOL DO YOU HAVE ON A TYPICAL DAY: PATIENT DOES NOT DRINK

## 2025-04-29 NOTE — PROGRESS NOTES
The following lab draw was performed today:    # tubes of blood were drawn as follows:    1 gold top tube, tube expiration date 3/3/26  2 green top tube, tube expiration date 1/1/26  1 lavender tube, tube expiration date 3/31/26    Using  #22 G x 1.5\" (black) straight needle and QuickShield   From [facility] at 0855.    All tubes inverted 20 times. Tubes left in top-side down position while preparing paperwork. Labels attached and also taped for security. Placed in bag and sent to lab via tube system at 0906. See media.

## 2025-04-29 NOTE — PROGRESS NOTES
[facility] Physicians - [facility] Internal Medicine      SUBJECTIVE:  Dionicio Calle (:  1975) is a 49 y.o. female here for evaluation of the following chief complaint(s):  Anxiety  R eye - sagging.  Impaired vision. Muscle cramping.  Swallowing symptoms.   Headaches - was managing with conservative therapy with massage and exercise.      Lightheaded and cold - CBC/CMP and TSH all normal. Will check Iron, TIBC, Iron sat and Ferritin level as this has been declining over the course of the last few checks of ferritin level.               Advised to wear compression stockings, increase fluid and increase salt intake.    Was advised to conitnue iron supplements.     Anxiety -      Perioral dermatitis -     Alternating constipation and diarrhea -   Assessment & Plan  1.Ptosis/  - Reports symptoms of facial sagging, heavy eyelids, and muscle spasms in her foot.  - Physical examination reveals significant drooping of the left eyelid and muscle fatigue.  - An acetylcholinesterase antibody test will be ordered to investigate the possibility of myasthenia gravis. Additional blood work, including a metabolic panel and complete blood count, will be conducted.  - If the acetylcholinesterase antibody test is positive, appropriate treatment options will be discussed.    2. Weight management.  - Weight has decreased from 156 pounds in 2025 to 151 pounds currently.  - BMI is within the normal range, but she desires to weigh 125 pounds.  - Advised to maintain a food diary for two weeks and email the results for further evaluation.  - Encouraged to continue her exercise regimen and consider adding intervals to her workouts.    3. Anemia.  - Reports ongoing issues with iron and constipation related to oral iron supplements.  - Iron levels will be rechecked.  - If iron levels remain low, intravenous iron supplementation may be considered.      History of Present Illness  The patient presents for evaluation of

## 2025-05-01 LAB — ACETYLCHOLINE BINDING ANTIBODY: 0 NMOL/L (ref 0–0.4)

## 2025-05-05 ENCOUNTER — RESULTS FOLLOW-UP (OUTPATIENT)
Dept: INTERNAL MEDICINE | Age: 50
End: 2025-05-05

## 2025-05-05 DIAGNOSIS — R79.89 ABNORMAL TSH: Primary | ICD-10-CM

## 2025-05-05 NOTE — TELEPHONE ENCOUNTER
Reviewed labs - Negative acetylcholine Ab and normal CBC and iron studies with borderline ferritin level.  Dionicio concentrating on iron rich foods that do not cause constipation. CMP - normal  TSH is low with normal free T4 - this is subclinical hyperthyroidism. Will recheck the labs in 1 month to recheck these levels. May need additional evaluation if still abnormal.     Sandra Avery MD  5/5/2025

## 2025-05-17 ENCOUNTER — OFFICE VISIT (OUTPATIENT)
Dept: FAMILY MEDICINE CLINIC | Age: 50
End: 2025-05-17
Payer: COMMERCIAL

## 2025-05-17 VITALS
RESPIRATION RATE: 18 BRPM | HEIGHT: 67 IN | BODY MASS INDEX: 23.86 KG/M2 | WEIGHT: 152 LBS | TEMPERATURE: 97.8 F | DIASTOLIC BLOOD PRESSURE: 68 MMHG | OXYGEN SATURATION: 98 % | HEART RATE: 102 BPM | SYSTOLIC BLOOD PRESSURE: 118 MMHG

## 2025-05-17 DIAGNOSIS — H10.13 ALLERGIC CONJUNCTIVITIS OF BOTH EYES: Primary | ICD-10-CM

## 2025-05-17 PROCEDURE — 99213 OFFICE O/P EST LOW 20 MIN: CPT | Performed by: FAMILY MEDICINE

## 2025-05-17 PROCEDURE — G8420 CALC BMI NORM PARAMETERS: HCPCS | Performed by: FAMILY MEDICINE

## 2025-05-17 PROCEDURE — G8427 DOCREV CUR MEDS BY ELIG CLIN: HCPCS | Performed by: FAMILY MEDICINE

## 2025-05-17 PROCEDURE — 1036F TOBACCO NON-USER: CPT | Performed by: FAMILY MEDICINE

## 2025-05-17 RX ORDER — NEOMYCIN/POLYMYXIN B/DEXAMETHA 3.5-10K-.1
0.5 OINTMENT (GRAM) OPHTHALMIC (EYE) 4 TIMES DAILY
Qty: 3.5 G | Refills: 0 | Status: SHIPPED | OUTPATIENT
Start: 2025-05-17

## 2025-05-17 ASSESSMENT — ENCOUNTER SYMPTOMS
SINUS PAIN: 0
DIARRHEA: 0
SINUS PRESSURE: 1
CONSTIPATION: 0
CHEST TIGHTNESS: 0
EYE REDNESS: 1
NAUSEA: 0
EYE ITCHING: 1
SHORTNESS OF BREATH: 0
TROUBLE SWALLOWING: 0
BACK PAIN: 0
SORE THROAT: 0
WHEEZING: 0
RHINORRHEA: 1
ABDOMINAL PAIN: 0
EYE PAIN: 1
VOMITING: 0
COUGH: 0

## 2025-05-17 NOTE — PROGRESS NOTES
Dionicio Calle (:  1975) is a 49 y.o. female,Established patient, here for evaluation of the following chief complaint(s):  Eye Problem (Bilateral redness)         Assessment & Plan  Allergic conjunctivitis of both eyes    Maxitrol ointment for eyes, pataday, claritin and follow up as needed in a few days    Orders:    Neomycin-Polymyxin-Dexameth (MAXITROL) 0.1 % OINT; Apply 0.5 inches to eye 4 times daily As need for 1 week then a week off and repeat as needed      Return if symptoms worsen or fail to improve.       Subjective   Here with allergy eye issues  Skin around right eye hurts and feels like sand paper, also right eye bothersome, itching and hurting swellign in the mornings art times  Trying pataday and claritin but not helping very much        Review of Systems   Constitutional:  Positive for fatigue. Negative for appetite change and fever.   HENT:  Positive for congestion, postnasal drip, rhinorrhea, sinus pressure and sneezing. Negative for ear pain, sinus pain, sore throat and trouble swallowing.    Eyes:  Positive for pain, redness and itching.   Respiratory:  Negative for cough, chest tightness, shortness of breath and wheezing.    Cardiovascular:  Negative for chest pain, palpitations and leg swelling.   Gastrointestinal:  Negative for abdominal pain, constipation, diarrhea, nausea and vomiting.   Endocrine: Negative for cold intolerance and heat intolerance.   Genitourinary:  Negative for difficulty urinating, hematuria and pelvic pain.   Musculoskeletal:  Negative for back pain, gait problem and joint swelling.   Skin:  Negative for rash and wound.   Neurological:  Negative for dizziness, syncope and headaches.   Hematological:  Negative for adenopathy.   Psychiatric/Behavioral:  Negative for confusion, sleep disturbance and suicidal ideas.           Objective   Physical Exam  Vitals and nursing note reviewed.   Constitutional:       General: She is not in acute distress.

## 2025-06-07 ENCOUNTER — HOSPITAL ENCOUNTER (OUTPATIENT)
Age: 50
Discharge: HOME | End: 2025-06-07
Payer: COMMERCIAL

## 2025-06-07 DIAGNOSIS — R79.89 ABNORMAL TSH: ICD-10-CM

## 2025-06-07 LAB
T4 FREE SERPL-MCNC: 1.2 NG/DL (ref 0.9–1.7)
TSH SERPL DL<=0.05 MIU/L-ACNC: 0.28 UIU/ML (ref 0.27–4.2)

## 2025-06-07 PROCEDURE — 84443 ASSAY THYROID STIM HORMONE: CPT

## 2025-06-07 PROCEDURE — 36415 COLL VENOUS BLD VENIPUNCTURE: CPT

## 2025-06-07 PROCEDURE — 84439 ASSAY OF FREE THYROXINE: CPT

## 2025-06-16 ENCOUNTER — TELEMEDICINE (OUTPATIENT)
Dept: PRIMARY CARE CLINIC | Age: 50
End: 2025-06-16
Payer: COMMERCIAL

## 2025-06-16 DIAGNOSIS — J01.00 ACUTE NON-RECURRENT MAXILLARY SINUSITIS: Primary | ICD-10-CM

## 2025-06-16 PROCEDURE — 99213 OFFICE O/P EST LOW 20 MIN: CPT | Performed by: INTERNAL MEDICINE

## 2025-06-16 RX ORDER — MONTELUKAST SODIUM 10 MG/1
10 TABLET ORAL NIGHTLY
Qty: 30 TABLET | Refills: 1 | Status: SHIPPED | OUTPATIENT
Start: 2025-06-16

## 2025-06-16 RX ORDER — DOXYCYCLINE HYCLATE 100 MG
100 TABLET ORAL 2 TIMES DAILY
Qty: 14 TABLET | Refills: 0 | Status: SHIPPED | OUTPATIENT
Start: 2025-06-16 | End: 2025-06-23

## 2025-06-16 RX ORDER — DEXAMETHASONE 2 MG/1
2 TABLET ORAL 2 TIMES DAILY WITH MEALS
Qty: 8 TABLET | Refills: 0 | Status: SHIPPED | OUTPATIENT
Start: 2025-06-16 | End: 2025-06-20

## 2025-06-16 RX ORDER — ONDANSETRON 4 MG/1
4 TABLET, FILM COATED ORAL 3 TIMES DAILY PRN
Qty: 15 TABLET | Refills: 0 | Status: SHIPPED | OUTPATIENT
Start: 2025-06-16

## 2025-06-16 NOTE — PROGRESS NOTES
2025    TELEHEALTH EVALUATION -- Audio/Visual    HPI:    Dionicio Calle (:  1975) has requested an audio/video evaluation for the following concern(s):    Dionicio started last week to have pressure behind her eyes.  Today, woke up with eye swelling and increased pressure. Has been taking loratadine and flonase nasal spray.  Has noticed her temperature being increased as well. Called off of work secondary to the swelling.    Will treat with decadron as well as doxycycline to cover for sinusitis   Add montelukast to treat allergic symptoms as well   Zofran provided for nausea       Review of Systems as above    Prior to Visit Medications    Medication Sig Taking? Authorizing Provider   doxycycline hyclate (VIBRA-TABS) 100 MG tablet Take 1 tablet by mouth 2 times daily for 7 days Yes Sandra Avery MD   montelukast (SINGULAIR) 10 MG tablet Take 1 tablet by mouth nightly Yes Sandra Avery MD   ondansetron (ZOFRAN) 4 MG tablet Take 1 tablet by mouth 3 times daily as needed for Nausea or Vomiting Yes Sandra Avery MD   Neomycin-Polymyxin-Dexameth (MAXITROL) 0.1 % OINT Apply 0.5 inches to eye 4 times daily As need for 1 week then a week off and repeat as needed  Dyan Fitzgerald DO       Social History     Tobacco Use    Smoking status: Never    Smokeless tobacco: Never   Vaping Use    Vaping status: Never Used   Substance Use Topics    Alcohol use: No    Drug use: No        PHYSICAL EXAMINATION:  Obvious facial swelling of periorbital areas B which extend down the cheek 2/3 of the way to the jaw.  Upper eyelids swollen as well.     Return as previously scheduled.    Dionicio Calle, was evaluated through a synchronous (real-time) audio-video encounter. The patient (or guardian if applicable) is aware that this is a billable service, which includes applicable co-pays. This Virtual Visit was conducted with patient's (and/or legal guardian's) consent. Patient identification was verified, and a caregiver

## 2025-06-21 ENCOUNTER — RESULTS FOLLOW-UP (OUTPATIENT)
Dept: PRIMARY CARE CLINIC | Age: 50
End: 2025-06-21

## 2025-07-14 DIAGNOSIS — E86.0 DEHYDRATION: Primary | ICD-10-CM

## 2025-07-14 NOTE — PROGRESS NOTES
Patient reports that she worked out on Saturday and overheated.  She drank a large gatorade and still felt poorly.  She then drank 8 - 21 ounces of water throughout the night.  Will check a BMP.  This will need faxed to [facility].     Sandra Avery MD  7/14/2025

## 2025-07-16 DIAGNOSIS — E86.0 DEHYDRATION: ICD-10-CM

## 2025-07-16 LAB
BUN BLDV-MCNC: 13 MG/DL
CALCIUM SERPL-MCNC: 9.3 MG/DL
CHLORIDE BLD-SCNC: 107 MMOL/L
CO2: 29 MMOL/L
CREAT SERPL-MCNC: 0.9 MG/DL
EGFR: 79
GLUCOSE BLD-MCNC: 89 MG/DL
POTASSIUM SERPL-SCNC: 4 MMOL/L
SODIUM BLD-SCNC: 138 MMOL/L

## (undated) DEVICE — CONTAINER SPEC 60ML PH 7NEUTRAL BUFF FRMLN RDY TO USE

## (undated) DEVICE — Z DISCONTINUED NO SUB IDED TUBING ETCO2 AD L6.5FT NSL ORAL CVD PRNG NONFLARED TIP OVR

## (undated) DEVICE — DEFENDO AIR WATER SUCTION AND BIOPSY VALVE KIT FOR  OLYMPUS: Brand: DEFENDO AIR/WATER/SUCTION AND BIOPSY VALVE

## (undated) DEVICE — FORCEPS BX L240CM JAW DIA2.4MM WRK CHN 2.8MM ORNG L CAP W/

## (undated) DEVICE — GAUZE,SPONGE,POST-OP,4X3,STRL,LF: Brand: MEDLINE

## (undated) DEVICE — BITEBLOCK 54FR W/ DENT RIM BLOX

## (undated) DEVICE — CONNECTOR IRRIGATION AUXILIARY H2O JET W/ PRT MTL THRD HYDR

## (undated) DEVICE — FORCEPS BX L160CM JAW DIA2.4MM YEL L CAP W/ NDL DISP RAD